# Patient Record
Sex: FEMALE | Race: BLACK OR AFRICAN AMERICAN | Employment: FULL TIME | URBAN - METROPOLITAN AREA
[De-identification: names, ages, dates, MRNs, and addresses within clinical notes are randomized per-mention and may not be internally consistent; named-entity substitution may affect disease eponyms.]

---

## 2017-02-02 ENCOUNTER — OFFICE VISIT (OUTPATIENT)
Dept: FAMILY MEDICINE CLINIC | Age: 37
End: 2017-02-02

## 2017-02-02 VITALS
SYSTOLIC BLOOD PRESSURE: 110 MMHG | HEIGHT: 64 IN | DIASTOLIC BLOOD PRESSURE: 68 MMHG | WEIGHT: 186.7 LBS | TEMPERATURE: 98.3 F | BODY MASS INDEX: 31.88 KG/M2 | RESPIRATION RATE: 20 BRPM | HEART RATE: 94 BPM | OXYGEN SATURATION: 98 %

## 2017-02-02 DIAGNOSIS — T50.5X5A EXPOSURE TO PHENTERMINE, INITIAL ENCOUNTER: Primary | ICD-10-CM

## 2017-02-02 DIAGNOSIS — L29.9 PRURITUS: ICD-10-CM

## 2017-02-02 DIAGNOSIS — E66.9 OBESITY (BMI 30-39.9): ICD-10-CM

## 2017-02-02 LAB
A-G RATIO,AGRAT: 1.6 RATIO (ref 1.1–2.6)
ALBUMIN SERPL-MCNC: 4.5 G/DL (ref 3.5–5)
ALP SERPL-CCNC: 67 U/L (ref 25–115)
ALT SERPL-CCNC: 13 U/L (ref 5–40)
AST SERPL W P-5'-P-CCNC: 17 U/L (ref 10–37)
BILIRUB SERPL-MCNC: 0.4 MG/DL (ref 0.2–1.2)
BILIRUBIN, DIRECT,CBIL: <0.2 MG/DL (ref 0–0.3)
GLOBULIN,GLOB: 2.9 G/DL (ref 2–4)
PROT SERPL-MCNC: 7.4 G/DL (ref 6.4–8.3)

## 2017-02-02 RX ORDER — PHENTERMINE HYDROCHLORIDE 37.5 MG/1
37.5 TABLET ORAL
Qty: 30 TAB | Refills: 0 | Status: SHIPPED | OUTPATIENT
Start: 2017-02-02 | End: 2018-05-08 | Stop reason: ALTCHOICE

## 2017-02-02 NOTE — PATIENT INSTRUCTIONS
Learning About Obesity  What is obesity? Obesity means having so much body fat that your health is in danger. Having too much body fat can lead to type 2 diabetes, heart disease, high blood pressure, arthritis, sleep apnea, and stroke. Even if you don't feel bad now, think about these health risks. Do they seem like a good reason to start on a new path toward a healthier weight? Or do you have another personal, powerful reason for wanting to lose weight? Whatever it is, keep it in mind. It can be hard to change eating habits and exercise habits. But with your own reason and plan, you can do it. How do you know if your weight is in the obesity range? To know if your weight is in the obesity range, your doctor looks at your body mass index (BMI) and waist size. Your BMI is a number that is calculated from your weight and your height. To figure your BMI for yourself, get a BMI table from your doctor or use an online tool, such as http://www.[employer].com/ on the [employer] of [employer]. What causes obesity? When you take in more calories than you burn off, you gain weight. How you eat, how active you are, and other things affect how your body uses calories and whether you gain weight. If you have family members who have too much body fat, you may have inherited a tendency to gain weight. And your family also helps form your eating and lifestyle habits, which can lead to obesity. Also, our busy lives make it harder to plan and cook healthy meals. For many of us, it's easier to reach for prepared foods, go out to eat, or go to the drive-through. But these foods are often high in saturated fat and calories. Portions are often too large. What can you do to reach a healthy weight? Focus on health, not diets. Diets are hard to stay on and don't work in the long run.  It is very hard to stay with a diet that includes lots of big changes in your eating habits. Instead of a diet, focus on lifestyle changes that will improve your health and achieve the right balance of energy and calories. To lose weight, you need to burn more calories than you take in. You can do it by eating healthy foods in reasonable amounts and becoming more active, even a little bit every day. Making small changes over time can add up to a lot. Make a plan for change. Many people have found that naming their reasons for change and staying focused on their plan can make a big difference. Work with your doctor to create a plan that is right for you. · Ask yourself: Rebecca Bernardo are my personal, most powerful reasons for wanting this change? What will my life look like when I've made the change? \"  · Set your long-term goal. Make it specific, such as \"I will lose x pounds. \"  · Break your long-term goal into smaller, short-term goals. Make these small steps specific and within your reach, things you know you can do. These steps are what keep you going from day to day. How can you stay on your plan for change? Be ready. Choose to start during a time when there are few events that might trigger slip-ups, like holidays, social events, and high-stress periods. Decide on your first few steps. Most people have more success when they make small changes, one step at a time. For example, you might switch a daily candy bar to a piece of fruit, walk 10 minutes more, or add more vegetables to a meal.  Line up your support people. Make sure you're not going to be alone as you make this change. Connect with people who understand how important it is to you. Ask family members and friends for help in keeping with your plan. And think about who could make it harder for you, and how to handle them. Try tracking. People who keep track of what they eat, feel, and do are better at losing weight. Try writing down things like:  · What and how much you eat.   · How you feel before and after each meal.  · Details about each meal (like eating out or at home, eating alone, or with friends or family). · What you do to be active. Look and plan. As you track, look for patterns that you may want to change. Take note of:  · When you eat and whether you skip meals. · How often you eat out. · How many fruits and vegetables you eat. · When you eat beyond feeling full. · When and why you eat for reasons other than being hungry. When you stray from your plan, don't get upset. Figure out what made you slip up and how you can fix it. Can you take medicines or have surgery to lose weight? Before your doctor will prescribe medicines or surgery, he or she will probably want you to be more active and follow your healthy eating plan for a period of time. These habits are key lifelong changes for managing your weight, with or without other medical treatment. And these changes can help you avoid weight-related health problems. Follow-up care is a key part of your treatment and safety. Be sure to make and go to all appointments, and call your doctor if you are having problems. It's also a good idea to know your test results and keep a list of the medicines you take. Where can you learn more? Go to http://luis-berna.info/. Enter N111 in the search box to learn more about \"Learning About Obesity. \"  Current as of: February 16, 2016  Content Version: 11.1  © 7760-9936 [employer]. Care instructions adapted under license by [employer] (which disclaims liability or warranty for this information). If you have questions about a medical condition or this instruction, always ask your healthcare professional. [address] any warranty or liability for your use of this information.

## 2017-02-02 NOTE — MR AVS SNAPSHOT
Visit Information Date & Time Provider Department Dept. Phone Encounter #  
 2/2/2017  7:45 AM Jason Hendricks MD [address] [place] [address] [phone] [id number] Upcoming Health Maintenance Date Due DTaP/Tdap/Td series (1 - Tdap) 5/31/2001 PAP AKA CERVICAL CYTOLOGY 5/12/2014 Allergies as of 2/2/2017  Review Complete On: 2/2/2017 By: Jason Hendricks MD  
 No Known Allergies Current Immunizations  Reviewed on 10/23/2015 Name Date Influenza Vaccine 9/25/2015 Influenza Vaccine PF 12/9/2016, 11/6/2014  9:11 AM  
 TB Skin Test (PPD) Intradermal 8/3/2015  1:35 PM  
  
 Not reviewed this visit You Were Diagnosed With   
  
 Codes Comments Exposure to phentermine, initial encounter    -  Primary ICD-10-CM: T50.5X5A 
ICD-9-CM: 994.9, E947.0 Obesity (BMI 30-39. 9)     ICD-10-CM: E66.9 ICD-9-CM: 278.00 Pruritus     ICD-10-CM: L29.9 ICD-9-CM: 698.9 Vitals BP Pulse Temp Resp Height(growth percentile) Weight(growth percentile) 110/68 (BP 1 Location: Left arm, BP Patient Position: Sitting) 94 98.3 °F (36.8 °C) (Oral) 20 5' 4\" (1.626 m) 186 lb 11.2 oz (84.7 kg) SpO2 BMI OB Status Smoking Status 98% 32.05 kg/m2 Having regular periods Never Smoker Vitals History BMI and BSA Data Body Mass Index Body Surface Area 32.05 kg/m 2 1.96 m 2 Preferred Pharmacy Pharmacy Name Phone [id number] [phone] [facility] [phone] Your Updated Medication List  
  
   
This list is accurate as of: 2/2/17  8:18 AM.  Always use your most recent med list.  
  
  
  
  
 buPROPion  mg XL tablet Commonly known as:  Kathlean Franco Take 1 Tab by mouth every morning.  
  
 ergocalciferol 50,000 unit capsule Commonly known as:  ERGOCALCIFEROL Take 1 Cap by mouth every seven (7) days. ibuprofen 800 mg tablet Commonly known as:  MOTRIN  
 Take 1 Tab by mouth every eight (8) hours as needed for Pain.  
  
 levonorgestrel-ethinyl estradiol 0.15 mg-30 mcg 3mpk Commonly known as:  Wilburt Finical Take  by mouth.  
  
 phentermine 37.5 mg tablet Commonly known as:  ADIPEX-P Take 1 Tab by mouth every morning. Max Daily Amount: 37.5 mg.  
  
 rizatriptan 10 mg disintegrating tablet Commonly known as:  MAXALT-MLT Take 1 Tab by mouth once as needed for Migraine for up to 1 dose. Prescriptions Printed Refills  
 phentermine (ADIPEX-P) 37.5 mg tablet 0 Sig: Take 1 Tab by mouth every morning. Max Daily Amount: 37.5 mg.  
 Class: Print Route: Oral  
  
To-Do List   
 02/02/2017 Lab:  HEPATIC FUNCTION PANEL Patient Instructions Learning About Obesity What is obesity? Obesity means having so much body fat that your health is in danger. Having too much body fat can lead to type 2 diabetes, heart disease, high blood pressure, arthritis, sleep apnea, and stroke. Even if you don't feel bad now, think about these health risks. Do they seem like a good reason to start on a new path toward a healthier weight? Or do you have another personal, powerful reason for wanting to lose weight? Whatever it is, keep it in mind. It can be hard to change eating habits and exercise habits. But with your own reason and plan, you can do it. How do you know if your weight is in the obesity range? To know if your weight is in the obesity range, your doctor looks at your body mass index (BMI) and waist size. Your BMI is a number that is calculated from your weight and your height. To figure your BMI for yourself, get a BMI table from your doctor or use an online tool, such as http://www.españa.com/ on the Toy[employer]ure[employer]rus of [employer]. What causes obesity? When you take in more calories than you burn off, you gain weight.  How you eat, how active you are, and other things affect how your body uses calories and whether you gain weight. If you have family members who have too much body fat, you may have inherited a tendency to gain weight. And your family also helps form your eating and lifestyle habits, which can lead to obesity. Also, our busy lives make it harder to plan and cook healthy meals. For many of us, it's easier to reach for prepared foods, go out to eat, or go to the drive-through. But these foods are often high in saturated fat and calories. Portions are often too large. What can you do to reach a healthy weight? Focus on health, not diets. Diets are hard to stay on and don't work in the long run. It is very hard to stay with a diet that includes lots of big changes in your eating habits. Instead of a diet, focus on lifestyle changes that will improve your health and achieve the right balance of energy and calories. To lose weight, you need to burn more calories than you take in. You can do it by eating healthy foods in reasonable amounts and becoming more active, even a little bit every day. Making small changes over time can add up to a lot. Make a plan for change. Many people have found that naming their reasons for change and staying focused on their plan can make a big difference. Work with your doctor to create a plan that is right for you. · Ask yourself: Alexa Louis are my personal, most powerful reasons for wanting this change? What will my life look like when I've made the change? \" · Set your long-term goal. Make it specific, such as \"I will lose x pounds. \" 
· Break your long-term goal into smaller, short-term goals. Make these small steps specific and within your reach, things you know you can do. These steps are what keep you going from day to day. How can you stay on your plan for change? Be ready.  Choose to start during a time when there are few events that might trigger slip-ups, like holidays, social events, and high-stress periods. Decide on your first few steps. Most people have more success when they make small changes, one step at a time. For example, you might switch a daily candy bar to a piece of fruit, walk 10 minutes more, or add more vegetables to a meal. 
Line up your support people. Make sure you're not going to be alone as you make this change. Connect with people who understand how important it is to you. Ask family members and friends for help in keeping with your plan. And think about who could make it harder for you, and how to handle them. Try tracking. People who keep track of what they eat, feel, and do are better at losing weight. Try writing down things like: · What and how much you eat. · How you feel before and after each meal. 
· Details about each meal (like eating out or at home, eating alone, or with friends or family). · What you do to be active. Look and plan. As you track, look for patterns that you may want to change. Take note of: · When you eat and whether you skip meals. · How often you eat out. · How many fruits and vegetables you eat. · When you eat beyond feeling full. · When and why you eat for reasons other than being hungry. When you stray from your plan, don't get upset. Figure out what made you slip up and how you can fix it. Can you take medicines or have surgery to lose weight? Before your doctor will prescribe medicines or surgery, he or she will probably want you to be more active and follow your healthy eating plan for a period of time. These habits are key lifelong changes for managing your weight, with or without other medical treatment. And these changes can help you avoid weight-related health problems. Follow-up care is a key part of your treatment and safety.  Be sure to make and go to all appointments, and call your doctor if you are having problems. It's also a good idea to know your test results and keep a list of the medicines you take. Where can you learn more? Go to http://luis-berna.info/. Enter N111 in the search box to learn more about \"Learning About Obesity. \" Current as of: February 16, 2016 Content Version: 11.1 © 4096-6048 [employer]. Care instructions adapted under license by [employer] (which disclaims liability or warranty for this information). If you have questions about a medical condition or this instruction, always ask your healthcare professional. [address] any warranty or liability for your use of this information. Introducing [facility] [place] [facility]! Dear Fermín Perrin: 
Thank you for requesting a Opbeat account. Our records indicate that you already have an active Opbeat account. You can access your account anytime at [email or web address] Did you know that you can access your hospital and ER discharge instructions at any time in Opbeat? You can also review all of your test results from your hospital stay or ER visit. Additional Information If you have questions, please visit the Frequently Asked Questions section of the Opbeat website at [email or web address] Remember, Opbeat is NOT to be used for urgent needs. For medical emergencies, dial 911. Now available from your iPhone and Android! Please provide this summary of care documentation to your next provider. Your primary care clinician is listed as [address]. If you have any questions after today's visit, please call [phone].

## 2017-02-02 NOTE — PROGRESS NOTES
Assessment/Plan:    Filomena Greenfield was seen today for obesity. Diagnoses and all orders for this visit:    Exposure to phentermine, initial encounter  -     HEPATIC FUNCTION PANEL; Future    Obesity (BMI 30-39.9)  -     phentermine (ADIPEX-P) 37.5 mg tablet; Take 1 Tab by mouth every morning. Max Daily Amount: 37.5 mg.    Pruritus    She assures me that she will get on track with her diet and exercise. Will try a 2nd mon of phentermine. Will get Zyrtec and try this for the skin itching. If no result in 2 weeks, will refer her to [facility]. F/u 4-6 weeks for wt check. The plan was discussed with the patient. The patient verbalized understanding and is in agreement with the plan. All medication potential side effects were discussed with the patient.    -------------------------------------------------------------------------------------------------------------------        Lonny oWlf is a 39 y.o. female and presents with Obesity (follow up)         Subjective:  Pt here for f/u of weight. Started her on phentermine in Dec.  She took the full Rx but has not been regular with exercise and diet. She has gained some of her lost weigfht back. Skin has been very itchy, no rash x 3 months. Has not made any changes in her diet or skin products. After the itching, has bought better moisturizers, but nothing is helping her issue. ROS:  Constitutional: No recent weight change. No weakness/fatigue. No f/c. Skin: No rashes, change in nails/hair, itching   HENT: No HA, dizziness. No hearing loss/tinnitus. No nasal congestion/discharge. Eyes: No change in vision, double/blurred vision or eye pain/redness. Cardiovascular: No CP/palpitations. No SPENCER/orthopnea/PND. Respiratory: No cough/sputum, dyspnea, wheezing. Gastointestinal: No dysphagia, reflux. No n/v. No constipation/diarrhea. No melena/rectal bleeding. Genitourinary: No dysuria, urinary hesitancy, nocturia, hematuria. No incontinence. Musculoskeletal: No joint pain/stiffness. No muscle pain/tenderness. Endo: No heat/cold intolerance, no polyuria/polydypsia. Heme: No h/o anemia. No easy bleeding/bruising. Allergy/Immunology: No seasonal rhinitis. Denies frequent colds, sinus/ear infections. Neurological: No seizures/numbness/weakness. No paresthesias. Psychiatric:  No depression, anxiety. The problem list was updated as a part of today's visit. Patient Active Problem List   Diagnosis Code   (none) - all problems resolved or deleted       The PSH, FH were reviewed. SH:  Social History   Substance Use Topics    Smoking status: Never Smoker    Smokeless tobacco: None    Alcohol use 2.0 oz/week     4 Glasses of wine per week      Comment: 4-5 glasses per week       Medications/Allergies:  Current Outpatient Prescriptions on File Prior to Visit   Medication Sig Dispense Refill    buPROPion XL (WELLBUTRIN XL) 300 mg XL tablet Take 1 Tab by mouth every morning. 30 Tab 1    ergocalciferol (ERGOCALCIFEROL) 50,000 unit capsule Take 1 Cap by mouth every seven (7) days. 12 Cap 1    levonorgestrel-ethinyl estradiol (SEASONALE) 0.15-30 mg-mcg per tablet Take  by mouth.  rizatriptan (MAXALT-MLT) 10 mg disintegrating tablet Take 1 Tab by mouth once as needed for Migraine for up to 1 dose. 6 Tab 3    ibuprofen (MOTRIN) 800 mg tablet Take 1 Tab by mouth every eight (8) hours as needed for Pain. 60 Tab 0     No current facility-administered medications on file prior to visit.          No Known Allergies      Health Maintenance:   Health Maintenance   Topic Date Due    DTaP/Tdap/Td series (1 - Tdap) 05/31/2001    PAP AKA CERVICAL CYTOLOGY  05/12/2014    INFLUENZA AGE 9 TO ADULT  Completed       Objective:  Visit Vitals    /68 (BP 1 Location: Left arm, BP Patient Position: Sitting)    Pulse 94    Temp 98.3 °F (36.8 °C) (Oral)    Resp 20    Ht 5' 4\" (1.626 m)    Wt 186 lb 11.2 oz (84.7 kg)    SpO2 98%    BMI 32.05 kg/m2          Nurses notes and VS reviewed. Physical Examination: General appearance - alert, well appearing, and in no distress          Labwork and Ancillary Studies:    CBC w/Diff  Lab Results   Component Value Date/Time    WBC 7.8 11/29/2016 11:32 AM    HGB 11.4 11/29/2016 11:32 AM    PLATELET 538 76/82/7347 11:32 AM         Basic Metabolic Profile  Lab Results   Component Value Date/Time    Sodium 138 11/29/2016 11:32 AM    Potassium 4.1 11/29/2016 11:32 AM    Chloride 95 11/29/2016 11:32 AM    CO2 21 11/29/2016 11:32 AM    Anion gap 22.0 11/29/2016 11:32 AM    Glucose 86 11/29/2016 11:32 AM    BUN 9 11/29/2016 11:32 AM    Creatinine 0.5 11/29/2016 11:32 AM    BUN/Creatinine ratio 20 07/28/2016 09:12 AM    GFR est AA >60 07/28/2016 09:12 AM    GFR est non-AA >60 07/28/2016 09:12 AM    Calcium 9.1 11/29/2016 11:32 AM         LFT  Lab Results   Component Value Date/Time    ALT (SGPT) 23 11/29/2016 11:32 AM    AST (SGOT) 23 11/29/2016 11:32 AM    Alk.  phosphatase 70 11/29/2016 11:32 AM    Bilirubin, direct <0.2 11/29/2016 11:32 AM    Bilirubin, total 0.5 11/29/2016 11:32 AM         Cholesterol  Lab Results   Component Value Date/Time    Cholesterol, total 161 11/29/2016 11:32 AM    HDL Cholesterol 81 11/29/2016 11:32 AM    LDL, calculated 60 11/29/2016 11:32 AM    Triglyceride 100 11/29/2016 11:32 AM

## 2017-02-02 NOTE — PROGRESS NOTES
Joseph Balderas is a 39 y.o. female  Chief Complaint   Patient presents with    Obesity     follow up     1. Have you been to the ER, urgent care clinic since your last visit? Hospitalized since your last visit? No    2. Have you seen or consulted any other health care providers outside of the [facility] since your last visit? Include any pap smears or colon screening.  No

## 2017-02-16 ENCOUNTER — HOSPITAL ENCOUNTER (OUTPATIENT)
Dept: NUTRITION | Age: 37
Discharge: HOME | End: 2017-02-16
Payer: COMMERCIAL

## 2017-02-16 PROCEDURE — 97802 MEDICAL NUTRITION INDIV IN: CPT

## 2017-02-16 NOTE — PROGRESS NOTES
Hiwot Cl[facility]ton [facility]  [address] [facility], [address] [place] [address] Road, [place], [address]  Phone: [phone]  Fax: [phone]   Nutrition Assessment - Medical Nutrition Therapy   Outpatient Initial Evaluation         Patient Name: Judson Sagastume : 1980   Treatment Diagnosis: Obesity Onset Date:    Referral Source: Juanjo Lo MD Start of Care [facility], [place]): 2017     Ht:  64 in  cm Wt: 186  lb  kg   BMI: 31.9  BMR   male  BMR female 1520     PMHx includes: Depression, anxiety     Medications of Nutritional Significance:   Wellbutrin, ergo, phentermine     Subjective/Assessment:   40 YO female referred to RD for obesity. Per BMI, pt is considered obese class 1. Pt has struggled with her weight for the past couple years. She has also dealt with depression, anxiety, stress and a bad relationship during that time, which likely contributed to the weight gain. She lives with her teenage daughter and works two jobs (1 full-time and 1 part-time). She is in her residency to become a LPC. 2 weeks ago, pt started working out every morning for 30-60 minutes doing cardio and strength training exercises to help with weight loss. Pt expressed comprehension, motivation and compliance is expected. Current Eating Patterns: Pt admits that she used to drink a lot of alcohol and dated a man who had very unhealthy eating habits, which influenced her habits. She is no longer with this man and her eating habits have improved. Her meal timing is sporadic and her \"meals\" lack balance. She skips b-fast most days, but otherwise has a boiled egg with half an avocado. Then, eats an apple an hour later. Lunch is either a chicken salad, wonton soup or 6-inch subway sandwich. She sometimes snacks in the afternoon (handful of popcorn). Adrián Balderrama is usually balanced, with a protein with starch and vegetables, but not always.  She mostly drinks water, but has 8 oz orange juice 4-5 days per week and drinks a bottle of wine on the weekends. She does not drink soda, but has coffee with creamer occasionally. Handouts/  Information Provided: [x]  Carbohydrates  [x]  Protein  []  Fiber  []  Serving Sizes  [x]  Snack Ideas  []  Food Journals []  Diabetes  []  Cholesterol  []  Sodium  [x]  Gen Nutr Guidelines  []  SBGM Guidelines  [x]  Others: List of non-starchy vegetables and Healthy Plate method, Egg Muffin recipe  - Discussed the Healthy Plate Method and appropriate portion sizes of different food groups. Explained the importance of combining carbohydrates with protein at meals and reviewed foods that contain each nutrient. Explained the importance of eating at regular times throughout the day and to avoid skipping meal to improve metabolism. Encouraged pt to continue current exercise (cardio and strength training) each week. Reviewed some b-fast ideas to get her day started right. Estimate Needs:   Calories: 1600  Protein: 100 Carbs: 180 Fat: 53   Kcal/day  g/day  g/day  g/day        percent: 25  45  30     Nutritional Goal - To promote lifestyle changes to result in:    [x]  Weight loss  []  Improved diabetic control  []  Decreased cholesterol levels  []  Decreased blood pressure  []  Weight maintenance []  Preventing any interactions associated with food allergies  []  Adequate weight gain toward goal weight  []  Other:        Recommendations: - Pt will combine carbohydrates with a protein source at every meal and snack.  - Pt will eat within 2 hours of waking and eat a meal every 4-5 hours while awake. If longer than 5 hours between meals, pt will have a snack. Avoid skipping meals.  - Pt will limit carbohydrate intake to 40-45 gm/meal and 15-20 gm/snack.  - Pt will follow the healthy plate method to ensure meals are balanced and to keep carbohydrate intake consistent throughout the day. - Pt will continue to exercise (cardio and strength training) at least 30 minutes, 3 days per week.      Information Reviewed with: pt Potential Barriers to Learning: none     Dietitian Signature: Yg Mcdonough RD Date: 2/16/2017   Follow-up: Mon, 3/20/17 @3:30pm at [facility] Time: 4:15 PM

## 2017-11-09 ENCOUNTER — CLINICAL SUPPORT (OUTPATIENT)
Dept: FAMILY MEDICINE CLINIC | Age: 37
End: 2017-11-09

## 2017-11-09 DIAGNOSIS — Z23 ENCOUNTER FOR IMMUNIZATION: Primary | ICD-10-CM

## 2017-11-09 NOTE — PATIENT INSTRUCTIONS
Vaccine Information Statement    Influenza (Flu) Vaccine (Inactivated or Recombinant): What you need to know    Many Vaccine Information Statements are available in English and other languages. See www.immunize.org/vis  Hojas de Información Sobre Vacunas están disponibles en Español y en muchos otros idiomas. Visite www.immunize.org/vis    1. Why get vaccinated? Influenza (flu) is a contagious disease that spreads around the [place] every year, usually between October and May. Flu is caused by influenza viruses, and is spread mainly by coughing, sneezing, and close contact. Anyone can get flu. Flu strikes suddenly and can last several days. Symptoms vary by age, but can include:   fever/chills   sore throat   muscle aches   fatigue   cough   headache    runny or stuffy nose    Flu can also lead to pneumonia and blood infections, and cause diarrhea and seizures in children. If you have a medical condition, such as heart or lung disease, flu can make it worse. Flu is more dangerous for some people. Infants and young children, people 72years of age and older, pregnant women, and people with certain health conditions or a weakened immune system are at greatest risk. Each year thousands of people in the [facility] die from flu, and many more are hospitalized. Flu vaccine can:   keep you from getting flu,   make flu less severe if you do get it, and   keep you from spreading flu to your family and other people. 2. Inactivated and recombinant flu vaccines    A dose of flu vaccine is recommended every flu season. Children 6 months through 6years of age may need two doses during the same flu season. Everyone else needs only one dose each flu season.        Some inactivated flu vaccines contain a very small amount of a mercury-based preservative called thimerosal. Studies have not shown thimerosal in vaccines to be harmful, but flu vaccines that do not contain thimerosal are available. There is no live flu virus in flu shots. They cannot cause the flu. There are many flu viruses, and they are always changing. Each year a new flu vaccine is made to protect against three or four viruses that are likely to cause disease in the upcoming flu season. But even when the vaccine doesnt exactly match these viruses, it may still provide some protection    Flu vaccine cannot prevent:   flu that is caused by a virus not covered by the vaccine, or   illnesses that look like flu but are not. It takes about 2 weeks for protection to develop after vaccination, and protection lasts through the flu season. 3. Some people should not get this vaccine    Tell the person who is giving you the vaccine:     If you have any severe, life-threatening allergies. If you ever had a life-threatening allergic reaction after a dose of flu vaccine, or have a severe allergy to any part of this vaccine, you may be advised not to get vaccinated. Most, but not all, types of flu vaccine contain a small amount of egg protein.  If you ever had Guillain-Barré Syndrome (also called GBS). Some people with a history of GBS should not get this vaccine. This should be discussed with your doctor.  If you are not feeling well. It is usually okay to get flu vaccine when you have a mild illness, but you might be asked to come back when you feel better. 4. Risks of a vaccine reaction    With any medicine, including vaccines, there is a chance of reactions. These are usually mild and go away on their own, but serious reactions are also possible. Most people who get a flu shot do not have any problems with it.      Minor problems following a flu shot include:    soreness, redness, or swelling where the shot was given     hoarseness   sore, red or itchy eyes   cough   fever   aches   headache   itching   fatigue  If these problems occur, they usually begin soon after the shot and last 1 or 2 days. More serious problems following a flu shot can include the following:     There may be a small increased risk of Guillain-Barré Syndrome (GBS) after inactivated flu vaccine. This risk has been estimated at 1 or 2 additional cases per million people vaccinated. This is much lower than the risk of severe complications from flu, which can be prevented by flu vaccine.  Young children who get the flu shot along with pneumococcal vaccine (PCV13) and/or DTaP vaccine at the same time might be slightly more likely to have a seizure caused by fever. Ask your doctor for more information. Tell your doctor if a child who is getting flu vaccine has ever had a seizure. Problems that could happen after any injected vaccine:      People sometimes faint after a medical procedure, including vaccination. Sitting or lying down for about 15 minutes can help prevent fainting, and injuries caused by a fall. Tell your doctor if you feel dizzy, or have vision changes or ringing in the ears.  Some people get severe pain in the shoulder and have difficulty moving the arm where a shot was given. This happens very rarely.  Any medication can cause a severe allergic reaction. Such reactions from a vaccine are very rare, estimated at about 1 in a million doses, and would happen within a few minutes to a few hours after the vaccination. As with any medicine, there is a very remote chance of a vaccine causing a serious injury or death. The safety of vaccines is always being monitored. For more information, visit: www.cdc.gov/vaccinesafety/    5. What if there is a serious reaction? What should I look for?  Look for anything that concerns you, such as signs of a severe allergic reaction, very high fever, or unusual behavior.     Signs of a severe allergic reaction can include hives, swelling of the face and throat, difficulty breathing, a fast heartbeat, dizziness, and weakness - usually within a few minutes to a few hours after the vaccination. What should I do?  If you think it is a severe allergic reaction or other emergency that cant wait, call 9-1-1 and get the person to the nearest hospital. Otherwise, call your doctor.  Reactions should be reported to the Vaccine Adverse Event Reporting System (VAERS). Your doctor should file this report, or you can do it yourself through  the VAERS web site at www.vaers. [facility].gov, or by calling 9-937.710.5265. VAERS does not give medical advice. 6. The National Vaccine Injury Compensation Program    The [facility] Vaccine Injury Compensation Program (VICP) is a federal program that was created to compensate people who may have been injured by certain vaccines. Persons who believe they may have been injured by a vaccine can learn about the program and about filing a claim by calling 0-154.396.5824 or visiting the [employer] website at www.[facility].gov/vaccinecompensation. There is a time limit to file a claim for compensation. 7. How can I learn more?  Ask your healthcare provider. He or she can give you the vaccine package insert or suggest other sources of information.  Call your local or state health department.  Contact the Centers for Disease Control and Prevention (CDC):  - Call 1-720.806.3612 (1-800-CDC-INFO) or  - Visit CDCs website at www.cdc.gov/flu    Vaccine Information Statement   Inactivated Influenza Vaccine   8/7/2015  42 LARA Smith [id number]    Department of Health and Human Services  Centers for Disease Control and Prevention    Office Use Only

## 2017-11-09 NOTE — PROGRESS NOTES
Per verbal orders of Dr. Pamela Marie, injection of flu shot given by Tori Najjar, LPN. Patient instructed to remain in clinic for 20 minutes afterwards, and to report any adverse reaction to me immediately. Vaccine documentation completed. Tolerated well. Consent signed.

## 2017-12-20 RX ORDER — BUPROPION HYDROCHLORIDE 300 MG/1
TABLET ORAL
Qty: 90 TAB | Refills: 0 | Status: SHIPPED | OUTPATIENT
Start: 2017-12-20 | End: 2018-11-02 | Stop reason: ALTCHOICE

## 2018-03-09 ENCOUNTER — OFFICE VISIT (OUTPATIENT)
Dept: FAMILY MEDICINE CLINIC | Age: 38
End: 2018-03-09

## 2018-03-09 ENCOUNTER — HOSPITAL ENCOUNTER (OUTPATIENT)
Dept: LAB | Age: 38
Discharge: HOME | End: 2018-03-09
Payer: COMMERCIAL

## 2018-03-09 VITALS
WEIGHT: 181 LBS | RESPIRATION RATE: 18 BRPM | SYSTOLIC BLOOD PRESSURE: 100 MMHG | HEIGHT: 64 IN | OXYGEN SATURATION: 99 % | HEART RATE: 64 BPM | TEMPERATURE: 98.1 F | DIASTOLIC BLOOD PRESSURE: 70 MMHG | BODY MASS INDEX: 30.9 KG/M2

## 2018-03-09 DIAGNOSIS — Z00.00 ANNUAL PHYSICAL EXAM: ICD-10-CM

## 2018-03-09 DIAGNOSIS — E04.9 GOITER: Primary | ICD-10-CM

## 2018-03-09 DIAGNOSIS — E66.9 OBESITY (BMI 30-39.9): ICD-10-CM

## 2018-03-09 LAB
ALBUMIN SERPL-MCNC: 3.6 G/DL (ref 3.4–5)
ALBUMIN/GLOB SERPL: 0.9 {RATIO} (ref 0.8–1.7)
ALP SERPL-CCNC: 65 U/L (ref 45–117)
ALT SERPL-CCNC: 18 U/L (ref 13–56)
ANION GAP SERPL CALC-SCNC: 7 MMOL/L (ref 3–18)
AST SERPL-CCNC: 15 U/L (ref 15–37)
BASOPHILS # BLD: 0 K/UL (ref 0–0.06)
BASOPHILS NFR BLD: 0 % (ref 0–2)
BILIRUB DIRECT SERPL-MCNC: 0.2 MG/DL (ref 0–0.2)
BILIRUB SERPL-MCNC: 0.6 MG/DL (ref 0.2–1)
BUN SERPL-MCNC: 11 MG/DL (ref 7–18)
BUN/CREAT SERPL: 12 (ref 12–20)
CALCIUM SERPL-MCNC: 9.1 MG/DL (ref 8.5–10.1)
CHLORIDE SERPL-SCNC: 109 MMOL/L (ref 100–108)
CHOLEST SERPL-MCNC: 146 MG/DL
CO2 SERPL-SCNC: 25 MMOL/L (ref 21–32)
CREAT SERPL-MCNC: 0.89 MG/DL (ref 0.6–1.3)
DIFFERENTIAL METHOD BLD: ABNORMAL
EOSINOPHIL # BLD: 0.1 K/UL (ref 0–0.4)
EOSINOPHIL NFR BLD: 2 % (ref 0–5)
ERYTHROCYTE [DISTWIDTH] IN BLOOD BY AUTOMATED COUNT: 15.6 % (ref 11.6–14.5)
GLOBULIN SER CALC-MCNC: 4 G/DL (ref 2–4)
GLUCOSE SERPL-MCNC: 90 MG/DL (ref 74–99)
HCT VFR BLD AUTO: 35.3 % (ref 35–45)
HDLC SERPL-MCNC: 84 MG/DL (ref 40–60)
HDLC SERPL: 1.7 {RATIO} (ref 0–5)
HGB BLD-MCNC: 11.3 G/DL (ref 12–16)
LDLC SERPL CALC-MCNC: 45 MG/DL (ref 0–100)
LIPID PROFILE,FLP: ABNORMAL
LYMPHOCYTES # BLD: 1.6 K/UL (ref 0.9–3.6)
LYMPHOCYTES NFR BLD: 24 % (ref 21–52)
MCH RBC QN AUTO: 26 PG (ref 24–34)
MCHC RBC AUTO-ENTMCNC: 32 G/DL (ref 31–37)
MCV RBC AUTO: 81.3 FL (ref 74–97)
MONOCYTES # BLD: 0.4 K/UL (ref 0.05–1.2)
MONOCYTES NFR BLD: 6 % (ref 3–10)
NEUTS SEG # BLD: 4.7 K/UL (ref 1.8–8)
NEUTS SEG NFR BLD: 68 % (ref 40–73)
PLATELET # BLD AUTO: 391 K/UL (ref 135–420)
PMV BLD AUTO: 11.6 FL (ref 9.2–11.8)
POTASSIUM SERPL-SCNC: 4.5 MMOL/L (ref 3.5–5.5)
PROT SERPL-MCNC: 7.6 G/DL (ref 6.4–8.2)
RBC # BLD AUTO: 4.34 M/UL (ref 4.2–5.3)
SODIUM SERPL-SCNC: 141 MMOL/L (ref 136–145)
T3FREE SERPL-MCNC: 2.7 PG/ML (ref 2.3–4.2)
T4 FREE SERPL-MCNC: 1.1 NG/DL (ref 0.7–1.5)
TRIGL SERPL-MCNC: 85 MG/DL (ref ?–150)
TSH SERPL DL<=0.05 MIU/L-ACNC: 0.85 UIU/ML (ref 0.36–3.74)
VLDLC SERPL CALC-MCNC: 17 MG/DL
WBC # BLD AUTO: 6.8 K/UL (ref 4.6–13.2)

## 2018-03-09 PROCEDURE — 80061 LIPID PANEL: CPT | Performed by: INTERNAL MEDICINE

## 2018-03-09 PROCEDURE — 80076 HEPATIC FUNCTION PANEL: CPT | Performed by: INTERNAL MEDICINE

## 2018-03-09 PROCEDURE — 84439 ASSAY OF FREE THYROXINE: CPT | Performed by: INTERNAL MEDICINE

## 2018-03-09 PROCEDURE — 84481 FREE ASSAY (FT-3): CPT | Performed by: INTERNAL MEDICINE

## 2018-03-09 PROCEDURE — 80048 BASIC METABOLIC PNL TOTAL CA: CPT | Performed by: INTERNAL MEDICINE

## 2018-03-09 PROCEDURE — 82306 VITAMIN D 25 HYDROXY: CPT | Performed by: INTERNAL MEDICINE

## 2018-03-09 PROCEDURE — 86376 MICROSOMAL ANTIBODY EACH: CPT | Performed by: INTERNAL MEDICINE

## 2018-03-09 PROCEDURE — 85025 COMPLETE CBC W/AUTO DIFF WBC: CPT | Performed by: INTERNAL MEDICINE

## 2018-03-09 PROCEDURE — 36415 COLL VENOUS BLD VENIPUNCTURE: CPT | Performed by: INTERNAL MEDICINE

## 2018-03-09 PROCEDURE — 84443 ASSAY THYROID STIM HORMONE: CPT | Performed by: INTERNAL MEDICINE

## 2018-03-09 NOTE — MR AVS SNAPSHOT
Moustapha Muller 
 
 
 [address] [facility] [address] [phone] Patient: Cece Crawford MRN: [id number] ZIM:[birth year] Visit Information Date & Time Provider Department Dept. Phone Encounter #  
 3/9/2018  7:45 AM Boo Siddharth Eubanks [facility] [phone] [id number] Upcoming Health Maintenance Date Due DTaP/Tdap/Td series (1 - Tdap) 5/31/2001 PAP AKA CERVICAL CYTOLOGY 5/12/2014 Allergies as of 3/9/2018  Review Complete On: 3/9/2018 By: Boo Eubanks MD  
 No Known Allergies Current Immunizations  Reviewed on 11/9/2017 Name Date Influenza Vaccine 9/25/2015 Influenza Vaccine (Quad) PF 11/9/2017  2:09 PM  
 Influenza Vaccine PF 12/9/2016, 11/6/2014  9:11 AM  
 TB Skin Test (PPD) Intradermal 8/3/2015  1:35 PM  
  
 Not reviewed this visit You Were Diagnosed With   
  
 Codes Comments Annual physical exam    -  Primary ICD-10-CM: Z00.00 ICD-9-CM: V70.0 Goiter     ICD-10-CM: E04.9 ICD-9-CM: 240.9 Vitals BP Pulse Temp Resp Height(growth percentile) Weight(growth percentile) 100/70 (BP 1 Location: Left arm, BP Patient Position: Sitting) 64 98.1 °F (36.7 °C) (Oral) 18 5' 4\" (1.626 m) 181 lb (82.1 kg) LMP SpO2 BMI OB Status Smoking Status 01/23/2018 99% 31.07 kg/m2 Having regular periods Never Smoker Vitals History BMI and BSA Data Body Mass Index Body Surface Area 31.07 kg/m 2 1.93 m 2 Preferred Pharmacy Pharmacy Name Phone [address] [facility] [phone] Your Updated Medication List  
  
   
This list is accurate as of 3/9/18  8:18 AM.  Always use your most recent med list.  
  
  
  
  
 buPROPion  mg XL tablet Commonly known as:  WELLBUTRIN XL  
TAKE 1 TABLET BY MOUTH EVERY MORNING  
  
 ergocalciferol 50,000 unit capsule Commonly known as:  ERGOCALCIFEROL Take 1 Cap by mouth every seven (7) days. ibuprofen 800 mg tablet Commonly known as:  MOTRIN Take 1 Tab by mouth every eight (8) hours as needed for Pain.  
  
 levonorgestrel-ethinyl estradiol 0.15 mg-30 mcg 3mpk Commonly known as:  Christina Oms Take  by mouth.  
  
 phentermine 37.5 mg tablet Commonly known as:  ADIPEX-P Take 1 Tab by mouth every morning. Max Daily Amount: 37.5 mg.  
  
 rizatriptan 10 mg disintegrating tablet Commonly known as:  MAXALT-MLT Take 1 Tab by mouth once as needed for Migraine for up to 1 dose. To-Do List   
 03/09/2018 Lab:  CBC WITH AUTOMATED DIFF   
  
 03/09/2018 Lab:  HEPATIC FUNCTION PANEL   
  
 03/09/2018 Lab:  LIPID PANEL   
  
 03/09/2018 Lab:  METABOLIC PANEL, BASIC   
  
 03/09/2018 Lab:  T3, FREE   
  
 03/09/2018 Lab:  T4, FREE   
  
 03/09/2018 Lab:  THYROID ANTIBODY PANEL   
  
 03/09/2018 Lab:  TSH 3RD GENERATION   
  
 03/09/2018 Imaging:  US THYROID/PARATHYROID/SOFT TISS   
  
 03/09/2018 Lab:  VITAMIN D, 25 HYDROXY Patient Instructions Goiter: Care Instructions Your Care Instructions A goiter is an enlarged thyroid gland. It can cause swelling in your neck. Your thyroid is found in the front of your neck. It makes a hormone that controls how your body uses energy. Goiters are caused by different things. Some are caused by high or low levels of thyroid hormone. Others are caused by too little iodine in the diet, or a growth or disease in the thyroid. In the [place], most goiters are caused by long-term autoimmune thyroiditis. This is also called Hashimoto's thyroiditis. It happens when the body's immune system damages the thyroid. You may take thyroid hormone to reduce the size of your goiter. Or you may need surgery or radioactive iodine treatment. Some people don't need any treatment. They only need to watch for changes in the goiter. Follow-up care is a key part of your treatment and safety. Be sure to make and go to all appointments, and call your doctor if you are having problems. It's also a good idea to know your test results and keep a list of the medicines you take. How can you care for yourself at home? · Be safe with medicines. Take your medicines exactly as prescribed. Call your doctor if you think you are having a problem with your medicine. You will get more details on the specific medicines your doctor prescribes. When should you call for help? Call 911 anytime you think you may need emergency care. For example, call if: 
? · You have trouble breathing. ? Watch closely for changes in your health, and be sure to contact your doctor if: 
? · Your eyes turn red and bulge. ? · You have trouble swallowing. ? · You feel very tired or weak. ? · You lose weight but are eating the same or more than usual.  
Where can you learn more? Go to http://luis-berna.info/. Enter L377 in the search box to learn more about \"Goiter: Care Instructions. \" Current as of: May 12, 2017 Content Version: 11.4 © 7055-5452 [employer]. Care instructions adapted under license by [employer] (which disclaims liability or warranty for this information). If you have questions about a medical condition or this instruction, always ask your healthcare professional. [address] any warranty or liability for your use of this information. Introducing [facility] & HEALTH SERVICES! Dear Enid Ortega: 
Thank you for requesting a InCast account. Our records indicate that you already have an active InCast account. You can access your account anytime at [email or web address] Did you know that you can access your hospital and ER discharge instructions at any time in InCast? You can also review all of your test results from your hospital stay or ER visit. Additional Information If you have questions, please visit the Frequently Asked Questions section of the [email or web address] website at [email or web address] Remember, SNAPP' is NOT to be used for urgent needs. For medical emergencies, dial 911. Now available from your iPhone and Android! Please provide this summary of care documentation to your next provider. Your primary care clinician is listed as [place]. If you have any questions after today's visit, please call [phone].

## 2018-03-09 NOTE — PROGRESS NOTES
Assessment/Plan:    *Diagnoses and all orders for this visit:    1. Goiter  -     US THYROID/PARATHYROID/SOFT TISS; Future    2. Annual physical exam  -     TSH 3RD GENERATION; Future  -     T4, FREE; Future  -     T3, FREE; Future  -     THYROID ANTIBODY PANEL; Future  -     CBC WITH AUTOMATED DIFF; Future  -     HEPATIC FUNCTION PANEL; Future  -     LIPID PANEL; Future  -     METABOLIC PANEL, BASIC; Future  -     VITAMIN D, 25 HYDROXY; Future    3. Obesity (BMI 30-39.9)  -     REFERRAL TO WEIGHT LOSS        Will contact pt with results. The plan was discussed with the patient. The patient verbalized understanding and is in agreement with the plan. All medication potential side effects were discussed with the patient.    -------------------------------------------------------------------------------------------------------------------        Oralee Laura is a 40 y.o. female and presents with New Order (Lab order- thyroid )         Subjective:  Pt here for concerns about her weight. This has been a constant madden all her life. Has strong fam hx for thyroid disease in various forms and we have checked this many times before. Is due for a repeat of labs. Has fatigue, brittle nails, dry, itchy skin, difficulty with losing weight. Has also had a goiter for some years, has never had an US. ROS:  Constitutional: No recent weight change. No weakness/fatigue. No f/c. Skin: No rashes, change in nails/hair, itching   HENT: No HA, dizziness. No hearing loss/tinnitus. No nasal congestion/discharge. Eyes: No change in vision, double/blurred vision or eye pain/redness. Cardiovascular: No CP/palpitations. No SPENCER/orthopnea/PND. Respiratory: No cough/sputum, dyspnea, wheezing. Gastointestinal: No dysphagia, reflux. No n/v. No constipation/diarrhea. No melena/rectal bleeding. Genitourinary: No dysuria, urinary hesitancy, nocturia, hematuria. No incontinence.    Musculoskeletal: No joint pain/stiffness. No muscle pain/tenderness. Endo: No heat/cold intolerance, no polyuria/polydypsia. Heme: No h/o anemia. No easy bleeding/bruising. Allergy/Immunology: No seasonal rhinitis. Denies frequent colds, sinus/ear infections. Neurological: No seizures/numbness/weakness. No paresthesias. Psychiatric:  No depression, anxiety. The problem list was updated as a part of today's visit. Patient Active Problem List   Diagnosis Code    Obesity (BMI 30-39. 9) E66.9       The PSH,  were reviewed. SH:  Social History   Substance Use Topics    Smoking status: Never Smoker    Smokeless tobacco: Never Used    Alcohol use 2.0 oz/week     4 Glasses of wine per week      Comment: 4-5 glasses per week       Medications/Allergies:  Current Outpatient Prescriptions on File Prior to Visit   Medication Sig Dispense Refill    buPROPion XL (WELLBUTRIN XL) 300 mg XL tablet TAKE 1 TABLET BY MOUTH EVERY MORNING 90 Tab 0    ergocalciferol (ERGOCALCIFEROL) 50,000 unit capsule Take 1 Cap by mouth every seven (7) days. 12 Cap 1    ibuprofen (MOTRIN) 800 mg tablet Take 1 Tab by mouth every eight (8) hours as needed for Pain. 60 Tab 0    levonorgestrel-ethinyl estradiol (SEASONALE) 0.15-30 mg-mcg per tablet Take  by mouth.  rizatriptan (MAXALT-MLT) 10 mg disintegrating tablet Take 1 Tab by mouth once as needed for Migraine for up to 1 dose. 6 Tab 3    phentermine (ADIPEX-P) 37.5 mg tablet Take 1 Tab by mouth every morning. Max Daily Amount: 37.5 mg. 30 Tab 0     No current facility-administered medications on file prior to visit.          No Known Allergies      Health Maintenance:   Health Maintenance   Topic Date Due    DTaP/Tdap/Td series (1 - Tdap) 05/31/2001    PAP AKA CERVICAL CYTOLOGY  05/12/2014    Influenza Age 9 to Adult  Completed       Objective:  Visit Vitals    /70 (BP 1 Location: Left arm, BP Patient Position: Sitting)    Pulse 64    Temp 98.1 °F (36.7 °C) (Oral)    Resp 18  Ht 5' 4\" (1.626 m)    Wt 181 lb (82.1 kg)    LMP 01/23/2018    SpO2 99%    BMI 31.07 kg/m2          Nurses notes and VS reviewed. Physical Examination: General appearance - alert, well appearing, and in no distress  Neck - thyroid exam: thyroid enlarged, smooth, nontender, no nodules  Chest - clear to auscultation, no wheezes, rales or rhonchi, symmetric air entry  Heart - normal rate, regular rhythm, normal S1, S2, no murmurs, rubs, clicks or gallops          Labwork and Ancillary Studies:    CBC w/Diff  Lab Results   Component Value Date/Time    WBC 7.8 11/29/2016 11:32 AM    HGB 11.4 (L) 11/29/2016 11:32 AM    PLATELET 552 33/48/6927 11:32 AM         Basic Metabolic Profile  Lab Results   Component Value Date/Time    Sodium 138 11/29/2016 11:32 AM    Potassium 4.1 11/29/2016 11:32 AM    Chloride 95 (L) 11/29/2016 11:32 AM    CO2 21 11/29/2016 11:32 AM    Anion gap 22.0 11/29/2016 11:32 AM    Glucose 86 11/29/2016 11:32 AM    BUN 9 11/29/2016 11:32 AM    Creatinine 0.5 11/29/2016 11:32 AM    BUN/Creatinine ratio 20 07/28/2016 09:12 AM    GFR est AA >60 07/28/2016 09:12 AM    GFR est non-AA >60 07/28/2016 09:12 AM    Calcium 9.1 11/29/2016 11:32 AM         LFT  Lab Results   Component Value Date/Time    ALT (SGPT) 13 02/02/2017 08:32 AM    AST (SGOT) 17 02/02/2017 08:32 AM    Alk.  phosphatase 67 02/02/2017 08:32 AM    Bilirubin, direct <0.2 02/02/2017 08:32 AM    Bilirubin, total 0.4 02/02/2017 08:32 AM          Cholesterol  Lab Results   Component Value Date/Time    Cholesterol, total 161 11/29/2016 11:32 AM    HDL Cholesterol 81 (H) 11/29/2016 11:32 AM    LDL, calculated 60 11/29/2016 11:32 AM    Triglyceride 100 11/29/2016 11:32 AM

## 2018-03-09 NOTE — PATIENT INSTRUCTIONS
Goiter: Care Instructions  Your Care Instructions    A goiter is an enlarged thyroid gland. It can cause swelling in your neck. Your thyroid is found in the front of your neck. It makes a hormone that controls how your body uses energy. Goiters are caused by different things. Some are caused by high or low levels of thyroid hormone. Others are caused by too little iodine in the diet, or a growth or disease in the thyroid. In the [place], most goiters are caused by long-term autoimmune thyroiditis. This is also called Hashimoto's thyroiditis. It happens when the body's immune system damages the thyroid. You may take thyroid hormone to reduce the size of your goiter. Or you may need surgery or radioactive iodine treatment. Some people don't need any treatment. They only need to watch for changes in the goiter. Follow-up care is a key part of your treatment and safety. Be sure to make and go to all appointments, and call your doctor if you are having problems. It's also a good idea to know your test results and keep a list of the medicines you take. How can you care for yourself at home? · Be safe with medicines. Take your medicines exactly as prescribed. Call your doctor if you think you are having a problem with your medicine. You will get more details on the specific medicines your doctor prescribes. When should you call for help? Call 911 anytime you think you may need emergency care. For example, call if:  ? · You have trouble breathing. ? Watch closely for changes in your health, and be sure to contact your doctor if:  ? · Your eyes turn red and bulge. ? · You have trouble swallowing. ? · You feel very tired or weak. ? · You lose weight but are eating the same or more than usual.   Where can you learn more? Go to http://luis-berna.info/. Enter G225 in the search box to learn more about \"Goiter: Care Instructions. \"  Current as of:  May 12, 2017  Content Version: 11.4  © 7242-4707 Healthwise, Incorporated. Care instructions adapted under license by [employer] [place] (which disclaims liability or warranty for this information). If you have questions about a medical condition or this instruction, always ask your healthcare professional. [address] any warranty or liability for your use of this information.

## 2018-03-09 NOTE — PROGRESS NOTES
Raeann Aldana is a 40 y.o. female (: 1980) presenting to address:    Chief Complaint   Patient presents with    New Order     Lab order- thyroid        Vitals:    18 0750   BP: 100/70   Pulse: 64   Resp: 18   Temp: 98.1 °F (36.7 °C)   TempSrc: Oral   SpO2: 99%   Weight: 181 lb (82.1 kg)   Height: 5' 4\" (1.626 m)   PainSc:   0 - No pain   LMP: 2018       Learning Assessment:     Learning Assessment 4/10/2014   PRIMARY LEARNER Patient   HIGHEST LEVEL OF EDUCATION - PRIMARY LEARNER  4 YEARS OF COLLEGE   BARRIERS PRIMARY LEARNER NONE   PRIMARY LANGUAGE ENGLISH   LEARNER PREFERENCE PRIMARY VIDEOS   ANSWERED BY patient   RELATIONSHIP SELF     Depression Screening:     PHQ over the last two weeks 3/9/2018   PHQ Not Done Active Diagnosis of Depression or Bipolar Disorder   Little interest or pleasure in doing things -   Feeling down, depressed or hopeless -   Total Score PHQ 2 -   Trouble falling or staying asleep, or sleeping too much -   Feeling tired or having little energy -   Poor appetite or overeating -   Feeling bad about yourself - or that you are a failure or have let yourself or your family down -   Trouble concentrating on things such as school, work, reading or watching TV -   Moving or speaking so slowly that other people could have noticed; or the opposite being so fidgety that others notice -   Thoughts of being better off dead, or hurting yourself in some way -   PHQ 9 Score -     Fall Risk Assessment:     Fall Risk Assessment, last 12 mths 3/9/2018   Able to walk? Yes   Fall in past 12 months? No     Abuse Screening:     Abuse Screening Questionnaire 3/9/2018   Do you ever feel afraid of your partner? N   Are you in a relationship with someone who physically or mentally threatens you? N   Is it safe for you to go home? Y     Coordination of Care Questionaire:   1. Have you been to the ER, urgent care clinic since your last visit? Hospitalized since your last visit? NO    2.  Have you seen or consulted any other health care providers outside of the [address] [place] [address] since your last visit? Include any pap smears or colon screening. YES GYN 7/2017    Advanced Directive:   1. Do you have an Advanced Directive? YES    2. Would you like information on Advanced Directives?  NO

## 2018-03-10 LAB — 25(OH)D3 SERPL-MCNC: 22.4 NG/ML (ref 30–100)

## 2018-03-11 NOTE — PROGRESS NOTES
Vit d is low. Start OTC 1000 units daily. Her thyroid work up was normal.  Rest of her labs were normal as well.     Will await thyroid US results and will see what advice she gets from the [employer] loss program.

## 2018-03-12 LAB
THYROGLOB AB SERPL-ACNC: <1 IU/ML (ref 0–0.9)
THYROPEROXIDASE AB SERPL-ACNC: 15 IU/ML (ref 0–34)

## 2018-03-13 ENCOUNTER — DOCUMENTATION ONLY (OUTPATIENT)
Dept: FAMILY MEDICINE CLINIC | Age: 38
End: 2018-03-13

## 2018-03-22 ENCOUNTER — TELEPHONE (OUTPATIENT)
Dept: FAMILY MEDICINE CLINIC | Age: 38
End: 2018-03-22

## 2018-03-22 NOTE — TELEPHONE ENCOUNTER
Notify pt her thyroid US showed just one small (<1 cm) nodule on the LT lobe of the thyroid. Does not show any abnormalities. Based on this, I would not recommend anything further.   Did she get in contact with the weight loss program?

## 2018-03-23 NOTE — TELEPHONE ENCOUNTER
Patient notified of results she voiced understanding and has an appointment with weight loss on 4.19.18

## 2018-04-11 DIAGNOSIS — E04.9 GOITER: ICD-10-CM

## 2018-04-19 ENCOUNTER — CLINICAL SUPPORT (OUTPATIENT)
Dept: FAMILY MEDICINE CLINIC | Age: 38
End: 2018-04-19

## 2018-04-19 DIAGNOSIS — E66.9 OBESITY, UNSPECIFIED CLASSIFICATION, UNSPECIFIED OBESITY TYPE, UNSPECIFIED WHETHER SERIOUS COMORBIDITY PRESENT: Primary | ICD-10-CM

## 2018-05-08 ENCOUNTER — OFFICE VISIT (OUTPATIENT)
Dept: FAMILY MEDICINE CLINIC | Age: 38
End: 2018-05-08

## 2018-05-08 VITALS
SYSTOLIC BLOOD PRESSURE: 112 MMHG | BODY MASS INDEX: 31.21 KG/M2 | DIASTOLIC BLOOD PRESSURE: 59 MMHG | HEART RATE: 80 BPM | RESPIRATION RATE: 16 BRPM | WEIGHT: 187.3 LBS | HEIGHT: 65 IN

## 2018-05-08 DIAGNOSIS — E66.9 OBESITY (BMI 30-39.9): Primary | ICD-10-CM

## 2018-05-08 DIAGNOSIS — E55.9 VITAMIN D DEFICIENCY: ICD-10-CM

## 2018-05-08 NOTE — MR AVS SNAPSHOT
Zechariah Mohamud [place] [address] [facility] [address] [facility] [id number] 
[phone] Patient: Sanjay Minors MRN: [id number] Visit Information Date & Time Provider Department Dept. Phone Encounter #  
 5/8/2018  2:45 PM Bi P[facility] [place] [id number] Follow-up Instructions Return in about 4 weeks (around 6/5/2018) for MS[facility] & Lab Draw. Follow-up and Disposition History Upcoming Health Maintenance Date Due DTaP/Tdap/Td series (1 - Tdap) 5/31/2001 PAP AKA CERVICAL CYTOLOGY 5/12/2014 Influenza Age 5 to Adult 8/1/2018 Allergies as of 5/8/2018  Review Complete On: 5/8/2018 By: Bi Paz, DO No Known Allergies Current Immunizations  Reviewed on 11/9/2017 Name Date Influenza Vaccine 9/25/2015 Influenza Vaccine (Quad) PF 11/9/2017  2:09 PM  
 Influenza Vaccine PF 12/9/2016, 11/6/2014  9:11 AM  
 TB Skin Test (PPD) Intradermal 8/3/2015  1:35 PM  
  
 Not reviewed this visit You Were Diagnosed With   
  
 Codes Comments Obesity (BMI 30-39.9)    -  Primary ICD-10-CM: F55.7 ICD-9-CM: 278.00 Vitamin D deficiency     ICD-10-CM: E55.9 ICD-9-CM: 268.9 Vitals BP Pulse Resp Height(growth percentile) Weight(growth percentile) LMP  
 112/59 80 16 5' 4.5\" (1.638 m) 187 lb 4.8 oz (85 kg) 04/25/2018 BMI OB Status Smoking Status 31.65 kg/m2 Having regular periods Never Smoker Vitals History BMI and BSA Data Body Mass Index Body Surface Area  
 31.65 kg/m 2 1.97 m 2 Preferred Pharmacy Pharmacy Name Phone Jeff Wang, [facility] [phone] Your Updated Medication List  
  
   
This list is accurate as of 5/8/18  3:37 PM.  Always use your most recent med list.  
  
  
  
  
 buPROPion  mg XL tablet Commonly known as:  WELLBUTRIN XL  
TAKE 1 TABLET BY MOUTH EVERY MORNING  
 ergocalciferol 50,000 unit capsule Commonly known as:  ERGOCALCIFEROL Take 1 Cap by mouth every seven (7) days. ibuprofen 800 mg tablet Commonly known as:  MOTRIN Take 1 Tab by mouth every eight (8) hours as needed for Pain.  
  
 levonorgestrel-ethinyl estradiol 0.15 mg-30 mcg 3mpk Commonly known as:  Ronit Repine Take  by mouth.  
  
 rizatriptan 10 mg disintegrating tablet Commonly known as:  MAXALT-MLT Take 1 Tab by mouth once as needed for Migraine for up to 1 dose. We Performed the Following AMB POC EKG ROUTINE W/ 12 LEADS, INTER & REP [22531 CPT(R)] Follow-up Instructions Return in about 4 weeks (around 6/5/2018) for [facility] & Lab Draw. Patient Instructions Homework for [employer] 
 
 
 
Exercise Exercise daily  
- Start slow, gradually increase your time by around 10 to 20 % a week - To prevent injury, take a recovery week every 4 weeks (reduce your exercise time and intensity by 1/2 during this time) - Your goal is to work up to 150 min a week; hard enough that you can't whistle or sing. It may take 6 months to work up to this. - Call the Health  at [facility] labs for exercise ideas ([phone]) Common Side Effects (In order of how common) -Fatigue 
-Hunger 
-Constipation 
-Low sodium 
-Hair Loss 1. Fatigue Everyone goes through this stage It's normal 
It lasts 10 - 14 days It goes away It's your body adjusting to the Ketogenic diet and it's normal  
 
 
2. Hunger More common in the first few days After your body adjusts to the Ketogenic diet it will go away 3. Constipation  
-Drink at least 64 oz of non-calorie fluid a day 
-Add fiber (at least 20 grams a day) 1. Get the New Direction products with fiber added 2. Use SUGAR-FREE products: Fiber One products, Benefiber or Metamucil If you're prone to constipation: Take a Stool Softener every day. (eg - Dulcolax Stool Softener 100 mg or Miralax) If you get constipated: 1. Start with a Stool Softener (produces a bowel movement in 72 hr): 
 Examples: 
    Miralax 1 capful twice a day (It's OK to go up to 3 caps for a few days) Dulcolax Stool Softener 100 mg 2. Next: If you still have constipation after 2 or 3 days, add a Stimulant Laxative (this will produce a bowel movement in 6 - 12 hr): 
 Examples: 
    Exlax (1 small square) for up to 4 days Dulcolax stimulant laxative (8.25 mg) Magnesium citrate pill 500 mg 3 - 4 pills a day 3. Or you can go straight to a combination Stool Softener / Stimulant at night. If  you need, you can add a morning dose. Examples: 
    Pericolace 50 mg / 8.25 mg Senokot-S  50 mg / 8.25 mg 
 
   4. Finally If You're Really locked up, get Liquid Magnesium Citrate (take  according to the directions) 4. Low blood levels of sodium 
-Not very common, especially if you're not taking a diuretic (fluid pill) If you develop a headache, feel fatigued, light-headed or dizzy Drink 1/2 cup of bouillon broth up to twice a day until you feel normal 
 
 
5. Hair loss 
-This is fairly uncommon; you may see more than a usual amount of hair in your brush or the shower drain This can happen with any physical stress (surgery, trauma or calorie restriction) or psychological stress. Although is it very concerning, it is often temporary and will resolve within 3 months. Some people notice a benefit from taking a daily dose of Biotin 2,500 mcg and increasing their Fish Oil intake. Books to [address] [place] [address] 1. Change Anything: The [employer] of Personal Success 
by Marcia Dong, Ahmet Dorsey, and Javier Quinonez 2. Mindless Eating 
by Samia Price 3. Perfectly Yourself 
by Berta Rivas 4. Me, Myself and I - 28 Days to Creative Self-Love 
by Veena Kirk version only Note: Reading these books is a small but significant investment in yourself. Merely following the diet will reliably result in weight loss. However, revising how you respond to stressful situations, how you relate to food and, your commitment to self-care (adequate sleep, exercise & daily reflection) is the ONLY way to protect your weight loss and free yourself from your patterns that lead to weight gain. Compliance We do not expect perfection but we do ask for your persistence. Your success is directly corolleated to your willingness to do the work of growing yourself. You will hit plateaus in your weight loss. You will run into situations that test your will power. You will encounter times when you feel frustrated. It's OK if you slip up from time to time. However, how your respond to your slip ups and, the adjustments you make to prevent future slip ups will determine you long-term success. If you find yourself temporarily slipping in the program, it's OK. We will gently nudge you forward and encourage you to do the work of identifying and moving beyond your stuck areas. However, if you find yourself wavering in the program for a prolonged time (more than 3 or 4 months) we will ask that you take a break from the program and work with a counselor to do some focused work in order to identify and break the patterns that are holding you back. Once you and your counselor is convinced that you have moved past those patterns and want to give the program another chance, we will gladly work with you to determine if you're ready to start back in the program. 
 
When returning after a break, if it's been less than 3 months, you do not need to repeat an orientation, labs or an EKG. If it's over been 3 months you will required to repeat an orientation and, if greater than 6 months, you will be required to repeat an orientation, labs and an EKG. Additional Tips - Consume your 4 daily meal replacements equally spaced over the    day No more than 6 hours between each meal 
 Breakfast is especially important - Get the support of family and friends 
 
- Snack-proof your house - Have strategies for social situations, meetings that run over or vacation - When you fall off the plan it's no big deal; just start right back. Turn those days into examples of what to change or avoid next time. Long-term Healthy Weight / Body Fat Different ways to determining your ideal weight: 
1. Keep your waist to less than 1/2 of your height 2. Keep your % body fat to under 30% for female and under 20% if male 3. Keep your BMI around 25 Supplements 1. Vitacost Synergy Basic Multi-Vitamin (Their In-House Brand) Take 1 capsule twice a day Found ONLY at [facility], NOT at [facility], [facility], alike Vitamin [employer], etc 
    SKU #: [id number]  
    $16.49   / 1 month supply 
    www. [employer]  [phone]  
 
 
2. N-Acetyl Cysteine (NAC) -- 600 mg 
     1 pill once a day Found at many stores but [address] has a good price: 
     Item #: [id number] [id number]  $9.99 / 120 pills (4 month supply) 
     www. [employer]  [phone] 3. Turmeric with Black Pepper Extract (or Bioperine) 500 mg 
    1 pill 1 time a day (more is joint pain or increased inflammation) Found at many stores but [address] has a good price: 
    SKU #: [id number]  $13.64 / 61 pills 
    www. [employer]  [phone] 4. Probiotic: 15-35 (35 billion CFU) 1 capsule twice a day for 2 weeks, then 3 days a week SKU #: [id number]  $27.99 / 120 capsules 
     www. [employer]  [phone] 5. Fish Oil Take 1 capsule daily FYI:  
Typical fish oil per pill =  mg and  mg 
Krill oil per pill = EPA 50 - 70 mg and DHA 27 - 250 mg 
 
6. Other things to help with will power -Andria [employer] Crab Apple - Helps you with self acceptance Put 4 drops in 10 oz water or a meal replacement 2 - 4 times a day Around $15 a vial which lasts ~3 months 
       www. [employer]  [phone] Drink Filtered Water My favorite water filter (adds minerals to your water and cheaper than Dora) pH REFRESH Alkaline Water Pitcher Ionizer With 2 Long-Life Filters - Alkaline  Machine - Ionized Water Alkalizer Filter, 84oz, 2.5L (2017 Model) Paul Lauraside) Sold on Helpjuice.com w/ Free Shipping 
 
 
 
^^^^^^^^^^^^^^^^^^^^^^^^^^^^^^^^^^^^^^^^^^^^^^^^^^^^^^^^^^^^^^^^^^^^^^^^^^^^^^^^^^^^^^^^^^^^^^^ Carbohydrates If you do not metabolize carbohydrates well, you will lose weight and keep the weigh off by keeping your carbohydrate intake low. How do you know if you do not metabolize carbs well? If your Triglycerides, sdLCL-C and Insulin Resistance are elevated, then you will do well to follow a low carb diet. Fun Facts About Carbs - The Typical American Diet = 450 grams a day - The Reducing Phase of the VLCD = 40 grams a day - Target for weight loss maintenance: 
 - Eat no more than 30 grams per meal 
 - Eat no more than 10 grams per snack (mid-morning and mid-afternoon snack) Resources for finding out where carbs hide in our foods: 
1. Our Registered Dietitians 2. Www. HealthcareSource. com/tools 3. Read food labels 4. Books - The Calorie Courtney Majors - The New Atkins Diet for a New You 
     - Desiree Galvin's NEW Carb and Calorie 4th Edition (my favorite) 5. Smart phone and Internet-based apps - SpiralcatnessPal  
     - Carb Manager If you want to get a better picture of your cardiac risk, consider getting a coronary calcium score:  Call [phone] to schedule one. Body Mass Index: Care Instructions Your Care Instructions Body mass index (BMI) can help you see if your weight is raising your risk for health problems. It uses a formula to compare how much you weigh with how tall you are. · A BMI lower than 18.5 is considered underweight. · A BMI between 18.5 and 24.9 is considered healthy. · A BMI between 25 and 29.9 is considered overweight. A BMI of 30 or higher is considered obese. If your BMI is in the normal range, it means that you have a lower risk for weight-related health problems. If your BMI is in the overweight or obese range, you may be at increased risk for weight-related health problems, such as high blood pressure, heart disease, stroke, arthritis or joint pain, and diabetes. If your BMI is in the underweight range, you may be at increased risk for health problems such as fatigue, lower protection (immunity) against illness, muscle loss, bone loss, hair loss, and hormone problems. BMI is just one measure of your risk for weight-related health problems. You may be at higher risk for health problems if you are not active, you eat an unhealthy diet, or you drink too much alcohol or use tobacco products. Follow-up care is a key part of your treatment and safety. Be sure to make and go to all appointments, and call your doctor if you are having problems. It's also a good idea to know your test results and keep a list of the medicines you take. How can you care for yourself at home? · Practice healthy eating habits. This includes eating plenty of fruits, vegetables, whole grains, lean protein, and low-fat dairy. · If your doctor recommends it, get more exercise. Walking is a good choice. Bit by bit, increase the amount you walk every day. Try for at least 30 minutes on most days of the week. · Do not smoke. Smoking can increase your risk for health problems. If you need help quitting, talk to your doctor about stop-smoking programs and medicines. These can increase your chances of quitting for good. · Limit alcohol to 2 drinks a day for men and 1 drink a day for women.  Too much alcohol can cause health problems. If you have a BMI higher than 25 · Your doctor may do other tests to check your risk for weight-related health problems. This may include measuring the distance around your waist. A waist measurement of more than 40 inches in men or 35 inches in women can increase the risk of weight-related health problems. · Talk with your doctor about steps you can take to stay healthy or improve your health. You may need to make lifestyle changes to lose weight and stay healthy, such as changing your diet and getting regular exercise. If you have a BMI lower than 18.5 · Your doctor may do other tests to check your risk for health problems. · Talk with your doctor about steps you can take to stay healthy or improve your health. You may need to make lifestyle changes to gain or maintain weight and stay healthy, such as getting more healthy foods in your diet and doing exercises to build muscle. Where can you learn more? Go to http://luis-berna.info/. Enter S176 in the search box to learn more about \"Body Mass Index: Care Instructions. \" Current as of: October 13, 2016 Content Version: 11.4 © 5646-5982 Helpmycash. Care instructions adapted under license by [employer] (which disclaims liability or warranty for this information). If you have questions about a medical condition or this instruction, always ask your healthcare professional. [address] any warranty or liability for your use of this information. Patient Instructions History Introducing \[facility] [place] [facility]\"" & HEALTH SERVICES! Dear Katharine Walsh: 
Thank you for requesting a [employer] account. Our records indicate that you already have an active [employer] account. You can access your account anytime at [email or web address] Did you know that you can access your hospital and ER discharge instructions at any time in Complexa? You can also review all of your test results from your hospital stay or ER visit. Additional Information If you have questions, please visit the Frequently Asked Questions section of the [employer]a website at [email or web address] Credit Card To Get Approved For. [email or web address] Remember, Complexa is NOT to be used for urgent needs. For medical emergencies, dial 911. Now available from your iPhone and Android! Please provide this summary of care documentation to your next provider. Your primary care clinician is listed as [address]. If you have any questions after today's visit, please call [phone].

## 2018-05-08 NOTE — PROGRESS NOTES
[facility] Loss Program Progress Note: Initial Physician Visit     Candie Sykes is a 40 y.o. female who is here for medical screening for entering the [facility] Weight Loss Program.     CC:  Obesity      Weight History  I personally reviewed the Medical Screening Lisa Scot completed by patient and scanned into media section of chart. It includes duration of their obesity, maximum weight, goal weight and weight gain time line (timing), all of which give the context of their obesity AND a Family History of their obesity. Is their Weight Loss Goal entered in to Comments? Yes = 145 lb    Weight loss History  I personally reviewed the Medical Screening Lisa Scot completed by the patient and scanned into media section of chart. It includes number of weight loss attempts, the weight loss program that patients was most successful using, and if they have any hx of anorectic medication use, including OTC supplements. This captures modifying factors. Significant Medical History  Past Medical History:   Diagnosis Date    GERD (gastroesophageal reflux disease)     currently resolved    IBS (irritable bowel syndrome)     Leiomyoma of uterus     Obesity (BMI 30-39.9) 3/9/2018       I personally reviewed the Medical Screening Lisa Scot completed by the patient and scanned into media section of chart. This allows me to assess associated symptoms that are significant in the assessment of the patient's obesity and the patient's Past Medical History. Outpatient Prescriptions Marked as Taking for the 5/8/18 encounter (Office Visit) with Angie Pradhan, DO   Medication Sig Dispense Refill    buPROPion XL (WELLBUTRIN XL) 300 mg XL tablet TAKE 1 TABLET BY MOUTH EVERY MORNING 90 Tab 0    ergocalciferol (ERGOCALCIFEROL) 50,000 unit capsule Take 1 Cap by mouth every seven (7) days. 12 Cap 1    levonorgestrel-ethinyl estradiol (SEASONALE) 0.15-30 mg-mcg per tablet Take  by mouth. Significant Psychosocial History   Has a doctor every diagnosed with Binge Eating Disorder, Bulemia or Anorexia? : no     Compliance  Upcoming Travel? no    Social History  Social History   Substance Use Topics    Smoking status: Never Smoker    Smokeless tobacco: Never Used    Alcohol use 2.0 oz/week     4 Glasses of wine per week      Comment: 4-5 glasses per week       Exercise  I personally reviewed the Medical Screening Gloria Blunt completed by the patient and scanned into media section of chart. Review of Systems  See HPI        Objective  Visit Vitals    /59    Pulse 80    Resp 16    Ht 5' 4.5\" (1.638 m)    Wt 187 lb 4.8 oz (85 kg)    LMP 04/25/2018    BMI 31.65 kg/m2         Weight Metrics 5/8/2018 5/8/2018 3/9/2018 2/2/2017 12/22/2016 12/9/2016 7/28/2016   Weight - 187 lb 4.8 oz 181 lb 186 lb 11.2 oz 187 lb 3.2 oz 191 lb 197 lb   Waist Measure Inches 36.25 - - - - - -   Body Fat % 37.1 - - - - - -   BMI - 31.65 kg/m2 31.07 kg/m2 32.05 kg/m2 32.13 kg/m2 32.79 kg/m2 33.8 kg/m2       Labs: See HDL labs scanned into Media section       Physical Exam    Vital Signs Reviewed  Weight Management Metrics Reviewed    Appearance: Obese  HEENT:  Scleral icterus?  no  Neck:  Thyromegaly or nodules? Recently worked up by Dr Martinez Ludwig  Mouth: Large tongue no  Heart:  RRR  Lungs:  LCTA-B  Abdomen:     Hepatomegaly? no   Striae present? no  Skin:    Acne?  no   Hirsutism? no   Skin tags? no   Acanthosis Nigricans?  no  Ext:  Edema? no      Assessment & Plan  Encounter Diagnoses   Name Primary?  Obesity (BMI 30-39. 9) Yes    Vitamin D deficiency        1. Labs reviewed w/ patient    2. EKG reviewed w/ patient:   Personally reviewed by me\   QTc = 396 sec (Upper limit is 440 for males & 460 for females)    Sinus  Rhythm   -RSR(V1) -nondiagnostic. PROBABLY NORMAL    3. Medication changes include: None    4.  Based on his history, labs and EKG, Juan Huynh is now enrolled for the [facility].     5.  Individualized Patient Plan is as follows:   Diet Regimen: 4 Meal Replacements daily. Weigh in: weekly at [facility]   BP f/up plan: weekly at [facility]       25 min of the 45 minutes face to face time with Jordi consisted of counseling & coordinating and/or discussing treatment plans in reference to her above mentioned diagnoses.

## 2018-05-08 NOTE — PATIENT INSTRUCTIONS
Homework for [employer]        Exercise    Exercise daily   - Start slow, gradually increase your time by around 10 to 20 % a week    - To prevent injury, take a recovery week every 4 weeks (reduce your exercise time and intensity by 1/2 during this time)    - Your goal is to work up to 150 min a week; hard enough that you can't whistle or sing. It may take 6 months to work up to this. - Call the Health  at [facility] labs for exercise ideas ([phone])          Common Side Effects   (In order of how common)    -Fatigue  -Hunger  -Constipation  -Low sodium  -Hair Loss      1. Fatigue  Everyone goes through this stage  It's normal  It lasts 10 - 14 days  It goes away  It's your body adjusting to the Ketogenic diet and it's normal       2. Hunger  More common in the first few days  After your body adjusts to the Ketogenic diet it will go away       3. Constipation   -Drink at least 64 oz of non-calorie fluid a day  -Add fiber (at least 20 grams a day)       1. Get the [facility] products with fiber added     2. Use SUGAR-FREE products: Fiber One products, Benefiber or Metamucil    If you're prone to constipation: Take a Stool Softener every day.     (eg - Dulcolax Stool Softener 100 mg or Miralax)    If you get constipated:     1. Start with a Stool Softener (produces a bowel movement in 72 hr):   Examples:      Miralax 1 capful twice a day (It's OK to go up to 3 caps for a few days)      Dulcolax Stool Softener 100 mg       2. Next: If you still have constipation after 2 or 3 days, add a Stimulant          Laxative (this will produce a bowel movement in 6 - 12 hr):   Examples:      Exlax (1 small square) for up to 4 days      Dulcolax stimulant laxative (8.25 mg)       Magnesium citrate pill 500 mg 3 - 4 pills a day       3. Or you can go straight to a combination Stool Softener / Stimulant at night. If  you need, you can add a morning dose.    Examples:      Pericolace 50 mg / 8.25 mg      Senokot-S  50 mg / 8.25 mg       4. Finally If You're Really locked up, get Liquid Magnesium Citrate (take  according to the directions)      4. Low blood levels of sodium  -Not very common, especially if you're not taking a diuretic (fluid pill)  If you develop a headache, feel fatigued, light-headed or dizzy    Drink 1/2 cup of bouillon broth up to twice a day until you feel normal      5. Hair loss  -This is fairly uncommon; you may see more than a usual amount of hair in your brush or the shower drain  This can happen with any physical stress (surgery, trauma or calorie restriction) or psychological stress. Although is it very concerning, it is often temporary and will resolve within 3 months. Some people notice a benefit from taking a daily dose of Biotin 2,500 mcg and increasing their Fish Oil intake. Books to [address] [place] [address]    1. Change Anything: The [employer] of Personal Success  by Willie Hurst, Georgina Zamora, and Nik Conway    2. Mindless Eating  by Eliana Ceballos    3. Perfectly Yourself  by Thania Santiago    4. Me, Myself and I - 28 Days to Creative Self-Love  by Max Smith version only    Note: Reading these books is a small but significant investment in yourself. Merely following the diet will reliably result in weight loss. However, revising how you respond to stressful situations, how you relate to food and, your commitment to self-care (adequate sleep, exercise & daily reflection) is the ONLY way to protect your weight loss and free yourself from your patterns that lead to weight gain. Compliance    We do not expect perfection but we do ask for your persistence. Your success is directly corolleated to your willingness to do the work of growing yourself. You will hit plateaus in your weight loss. You will run into situations that test your will power. You will encounter times when you feel frustrated.     It's OK if you slip up from time to time. However, how your respond to your slip ups and, the adjustments you make to prevent future slip ups will determine you long-term success. If you find yourself temporarily slipping in the program, it's OK. We will gently nudge you forward and encourage you to do the work of identifying and moving beyond your stuck areas. However, if you find yourself wavering in the program for a prolonged time (more than 3 or 4 months) we will ask that you take a break from the program and work with a counselor to do some focused work in order to identify and break the patterns that are holding you back. Once you and your counselor is convinced that you have moved past those patterns and want to give the program another chance, we will gladly work with you to determine if you're ready to start back in the program.    When returning after a break, if it's been less than 3 months, you do not need to repeat an orientation, labs or an EKG. If it's over been 3 months you will required to repeat an orientation and, if greater than 6 months, you will be required to repeat an orientation, labs and an EKG. Additional Tips    - Consume your 4 daily meal replacements equally spaced over the    day   No more than 6 hours between each meal   Breakfast is especially important    - Get the support of family and friends    - Snack-proof your house    - Have strategies for social situations, meetings that run over or vacation      - When you fall off the plan it's no big deal; just start right back. Turn those days into examples of what to change or avoid next time. Long-term Healthy Weight / Body Fat  Different ways to determining your ideal weight:  1. Keep your waist to less than 1/2 of your height   2. Keep your % body fat to under 30% for female and under 20% if male  3. Keep your BMI around 25          Supplements      1.   Vitacost Synergy Basic Multi-Vitamin (Their In-House Brand)      Take 1 capsule twice a day        Found ONLY at [facility], NOT at [facility], [employer], [facility], the Vitamin Shoppe, etc      SKU #: [id number]       $16.49   / 1 month supply      [employer]com  [phone]       2. N-Acetyl Cysteine (NAC) -- 600 mg       1 pill once a day       Found at many stores but Lisa Ortega has a good price:       Item #: [id number]  $9.99 / 120 pills (4 month supply)       www. [facility]  [phone]      3. Turmeric with Black Pepper Extract (or Bioperine) 500 mg      1 pill 1 time a day (more is joint pain or increased inflammation)      Found at many stores but [employer] has a good price:      SKU #: [id number]  $13.64 / 60 pills      [email or web address]  [phone]       4. Probiotic: 15-35 (35 billion CFU)         1 capsule twice a day for 2 weeks, then 3 days a week       SKU #: [id number]  $27.99 / 120 capsules       [email or web address]  [phone]       5. Fish Oil      Take 1 capsule daily                             FYI:   Typical fish oil per pill =  mg and  mg  Krill oil per pill = EPA 50 - 70 mg and DHA 27 - 250 mg    6. Other things to help with will power      -[id number] you with self acceptance           Put 4 drops in 10 oz water or a meal replacement 2 - 4 times a day         Around $15 a vial which lasts ~3 months         www. [facility] [facility]  [phone]                Drink Filtered Water    My favorite water filter (adds minerals to your water and cheaper than Dora)    pH REFRESH Alkaline Water Pitcher Ionizer With 2 Long-Life Filters - Alkaline  Machine - Ionized Water Alkalizer Filter, 84oz, 2.5L (2017 Model) (White)   Sold on [employer] w/ Free Shipping        ^^^^^^^^^^^^^^^^^^^^^^^^^^^^^^^^^^^^^^^^^^^^^^^^^^^^^^^^^^^^^^^^^^^^^^^^^^^^^^^^^^^^^^^^^^^^^^^      Carbohydrates     If you do not metabolize carbohydrates well, you will lose weight and keep the weigh off by keeping your carbohydrate intake low. How do you know if you do not metabolize carbs well? If your Triglycerides, sdLCL-C and Insulin Resistance are elevated, then you will do well to follow a low carb diet. Fun Facts About Carbs    - The Typical American Diet = 450 grams a day    - The Reducing Phase of the VLCD = 40 grams a day    - Target for weight loss maintenance:   - Eat no more than 30 grams per meal   - Eat no more than 10 grams per snack (mid-morning and mid-afternoon snack)        Resources for finding out where carbs hide in our foods:  1. Our Registered Dietitians    2. Www. Atkins. com/tools    3. Read food labels    4. Books       - The Calorie Teresa Dryer       - The [place] System Diet for a New You       - Desiree Galvin's NEW Carb and Calorie 4th Edition (my favorite)    5. Smart phone and Internet-based apps       - The Campaign Solution        - Carb Manager      If you want to get a better picture of your cardiac risk, consider getting a coronary calcium score:  Call [phone] to schedule one. Body Mass Index: Care Instructions  Your Care Instructions    Body mass index (BMI) can help you see if your weight is raising your risk for health problems. It uses a formula to compare how much you weigh with how tall you are. · A BMI lower than 18.5 is considered underweight. · A BMI between 18.5 and 24.9 is considered healthy. · A BMI between 25 and 29.9 is considered overweight. A BMI of 30 or higher is considered obese. If your BMI is in the normal range, it means that you have a lower risk for weight-related health problems. If your BMI is in the overweight or obese range, you may be at increased risk for weight-related health problems, such as high blood pressure, heart disease, stroke, arthritis or joint pain, and diabetes. If your BMI is in the underweight range, you may be at increased risk for health problems such as fatigue, lower protection (immunity) against illness, muscle loss, bone loss, hair loss, and hormone problems.   BMI is just one measure of your risk for weight-related health problems. You may be at higher risk for health problems if you are not active, you eat an unhealthy diet, or you drink too much alcohol or use tobacco products. Follow-up care is a key part of your treatment and safety. Be sure to make and go to all appointments, and call your doctor if you are having problems. It's also a good idea to know your test results and keep a list of the medicines you take. How can you care for yourself at home? · Practice healthy eating habits. This includes eating plenty of fruits, vegetables, whole grains, lean protein, and low-fat dairy. · If your doctor recommends it, get more exercise. Walking is a good choice. Bit by bit, increase the amount you walk every day. Try for at least 30 minutes on most days of the week. · Do not smoke. Smoking can increase your risk for health problems. If you need help quitting, talk to your doctor about stop-smoking programs and medicines. These can increase your chances of quitting for good. · Limit alcohol to 2 drinks a day for men and 1 drink a day for women. Too much alcohol can cause health problems. If you have a BMI higher than 25  · Your doctor may do other tests to check your risk for weight-related health problems. This may include measuring the distance around your waist. A waist measurement of more than 40 inches in men or 35 inches in women can increase the risk of weight-related health problems. · Talk with your doctor about steps you can take to stay healthy or improve your health. You may need to make lifestyle changes to lose weight and stay healthy, such as changing your diet and getting regular exercise. If you have a BMI lower than 18.5  · Your doctor may do other tests to check your risk for health problems. · Talk with your doctor about steps you can take to stay healthy or improve your health.  You may need to make lifestyle changes to gain or maintain weight and stay healthy, such as getting more healthy foods in your diet and doing exercises to build muscle. Where can you learn more? Go to http://luis-berna.info/. Enter S176 in the search box to learn more about \"Body Mass Index: Care Instructions. \"  Current as of: October 13, 2016  Content Version: 11.4  © 7860-1407 [employer]. Care instructions adapted under license by [employer]. (which disclaims liability or warranty for this information). If you have questions about a medical condition or this instruction, always ask your healthcare professional. [address] any warranty or liability for your use of this information.

## 2018-05-18 ENCOUNTER — CLINICAL SUPPORT (OUTPATIENT)
Dept: FAMILY MEDICINE CLINIC | Age: 38
End: 2018-05-18

## 2018-05-18 VITALS
HEART RATE: 84 BPM | SYSTOLIC BLOOD PRESSURE: 120 MMHG | HEIGHT: 65 IN | RESPIRATION RATE: 16 BRPM | BODY MASS INDEX: 29.76 KG/M2 | DIASTOLIC BLOOD PRESSURE: 77 MMHG | WEIGHT: 178.6 LBS

## 2018-05-18 DIAGNOSIS — E66.9 OBESITY (BMI 35.0-39.9 WITHOUT COMORBIDITY): Primary | ICD-10-CM

## 2018-05-18 NOTE — PROGRESS NOTES
Progress Note: Weekly Education Class in the [facility] Loss Program   Is there anything that you or the patient needs to let Dr Christine Rogers know about? no  Over the past week, have you experienced any side-effects? Yes; headache , nausea, irritability and  fatigue    Kelley Chiang is a 40 y.o. female who is enrolled in [facility]    Visit Vitals     5' 4.5\" (1.638 m)    Wt 178 lb 9.6 oz (81 kg)    LMP 04/25/2018    BMI 30.18 kg/m2     Weight Metrics 5/18/2018 5/8/2018 5/8/2018 3/9/2018 2/2/2017 12/22/2016 12/9/2016   Weight 178 lb 9.6 oz - 187 lb 4.8 oz 181 lb 186 lb 11.2 oz 187 lb 3.2 oz 191 lb   Waist Measure Inches - 36.25 - - - - -   Body Fat % - 37.1 - - - - -   BMI 30.18 kg/m2 - 31.65 kg/m2 31.07 kg/m2 32.05 kg/m2 32.13 kg/m2 32.79 kg/m2         Have you received any other medical care this week? no  If yes, where and for what? Have you had any change in your medications since your last visit? no  If yes what? Did you have any problems adhering to the program last week? no  If yes, please explain:       Eating Habits Over Last Week:  Did you take in 64 oz of non-caloric fluids? yes     Did you consume your 4 meal replacements each day?  yes       Physical Activity Over the Past Week:    Aerobic exercise: 205 min  Resistance exercise: 2 workouts / week

## 2018-05-22 ENCOUNTER — CLINICAL SUPPORT (OUTPATIENT)
Dept: FAMILY MEDICINE CLINIC | Age: 38
End: 2018-05-22

## 2018-05-22 VITALS
BODY MASS INDEX: 29.74 KG/M2 | HEART RATE: 77 BPM | HEIGHT: 65 IN | RESPIRATION RATE: 16 BRPM | SYSTOLIC BLOOD PRESSURE: 116 MMHG | DIASTOLIC BLOOD PRESSURE: 74 MMHG | WEIGHT: 178.5 LBS

## 2018-05-22 DIAGNOSIS — E66.9 OBESITY (BMI 35.0-39.9 WITHOUT COMORBIDITY): Primary | ICD-10-CM

## 2018-05-22 NOTE — PROGRESS NOTES
Progress Note: Weekly Education Class in the [facility] Program   Is there anything that you or the patient needs to let Dr Mago Hinton know about? no  Over the past week, have you experienced any side-effects? no    Sanjay Saucedo is a 40 y.o. female who is enrolled in [facility]    Visit Vitals    Ht 5' 4.5\" (1.638 m)    LMP 04/25/2018     Weight Metrics 5/18/2018 5/18/2018 5/8/2018 5/8/2018 3/9/2018 2/2/2017 12/22/2016   Weight - 178 lb 9.6 oz - 187 lb 4.8 oz 181 lb 186 lb 11.2 oz 187 lb 3.2 oz   Waist Measure Inches 36 - 36.25 - - - -   Body Fat % 35.5 - 37.1 - - - -   BMI - 30.18 kg/m2 - 31.65 kg/m2 31.07 kg/m2 32.05 kg/m2 32.13 kg/m2         Have you received any other medical care this week? no  If yes, where and for what? Have you had any change in your medications since your last visit? yes  If yes what? Did you have any problems adhering to the program last week? no  If yes, please explain:       Eating Habits Over Last Week:  Did you take in 64 oz of non-caloric fluids? yes     Did you consume your 4 meal replacements each day?  yes       Physical Activity Over the Past Week:    Aerobic exercise: 70 min  Resistance exercise: 0 workouts / week

## 2018-11-02 ENCOUNTER — OFFICE VISIT (OUTPATIENT)
Dept: FAMILY MEDICINE CLINIC | Age: 38
End: 2018-11-02

## 2018-11-02 VITALS
SYSTOLIC BLOOD PRESSURE: 110 MMHG | TEMPERATURE: 98.9 F | HEIGHT: 65 IN | DIASTOLIC BLOOD PRESSURE: 62 MMHG | HEART RATE: 84 BPM | RESPIRATION RATE: 18 BRPM | BODY MASS INDEX: 28.72 KG/M2 | WEIGHT: 172.4 LBS | OXYGEN SATURATION: 98 %

## 2018-11-02 DIAGNOSIS — G47.9 SLEEP DISORDER: ICD-10-CM

## 2018-11-02 DIAGNOSIS — Z23 ENCOUNTER FOR IMMUNIZATION: ICD-10-CM

## 2018-11-02 DIAGNOSIS — E55.9 HYPOVITAMINOSIS D: ICD-10-CM

## 2018-11-02 DIAGNOSIS — Z00.00 ANNUAL PHYSICAL EXAM: Primary | ICD-10-CM

## 2018-11-02 DIAGNOSIS — M54.6 ACUTE LEFT-SIDED THORACIC BACK PAIN: ICD-10-CM

## 2018-11-02 DIAGNOSIS — E04.1 LEFT THYROID NODULE: ICD-10-CM

## 2018-11-02 RX ORDER — ERGOCALCIFEROL 1.25 MG/1
50000 CAPSULE ORAL
Qty: 12 CAP | Refills: 3 | Status: SHIPPED | OUTPATIENT
Start: 2018-11-02

## 2018-11-02 RX ORDER — CYCLOBENZAPRINE HCL 10 MG
10 TABLET ORAL
Qty: 20 TAB | Refills: 0 | Status: SHIPPED | OUTPATIENT
Start: 2018-11-02 | End: 2018-11-19 | Stop reason: SDUPTHER

## 2018-11-02 NOTE — PROGRESS NOTES
Flu shot Immunization/s administered 11/2/2018 by Jluis Best LPN with guardian's consent. Patient tolerated procedure well. No reactions noted.

## 2018-11-02 NOTE — PATIENT INSTRUCTIONS

## 2018-11-02 NOTE — PROGRESS NOTES
Blanca Heaton is a 45 y.o. female (: 1980) presenting to address: Chief Complaint Patient presents with  Complete Physical  
 
 
Vitals:  
 18 0827 BP: 110/62 Pulse: 84 Resp: 18 Temp: 98.9 °F (37.2 °C) TempSrc: Oral  
SpO2: 98% Weight: 172 lb 6.4 oz (78.2 kg) Height: 5' 4.5\" (1.638 m) PainSc:   4 PainLoc: Back LMP: 10/17/2018 Hearing/Vision:  
 
 Visual Acuity Screening Right eye Left eye Both eyes Without correction:     
With correction: 20/20 20/20 20/20 Learning Assessment:  
 
Learning Assessment 4/10/2014 PRIMARY LEARNER Patient HIGHEST LEVEL OF EDUCATION - PRIMARY LEARNER  4 YEARS OF COLLEGE  
BARRIERS PRIMARY LEARNER NONE PRIMARY LANGUAGE ENGLISH  
LEARNER PREFERENCE PRIMARY VIDEOS  
ANSWERED BY patient RELATIONSHIP SELF Depression Screening: PHQ over the last two weeks 3/9/2018 PHQ Not Done Active Diagnosis of Depression or Bipolar Disorder Little interest or pleasure in doing things - Feeling down, depressed, irritable, or hopeless - Total Score PHQ 2 - Trouble falling or staying asleep, or sleeping too much - Feeling tired or having little energy - Poor appetite, weight loss, or overeating - Feeling bad about yourself - or that you are a failure or have let yourself or your family down - Trouble concentrating on things such as school, work, reading, or watching TV - Moving or speaking so slowly that other people could have noticed; or the opposite being so fidgety that others notice - Thoughts of being better off dead, or hurting yourself in some way -  
PHQ 9 Score - Fall Risk Assessment:  
 
Fall Risk Assessment, last 12 mths 3/9/2018 Able to walk? Yes Fall in past 12 months? No  
 
Abuse Screening:  
 
Abuse Screening Questionnaire 3/9/2018 Do you ever feel afraid of your partner? Danie Wilson Are you in a relationship with someone who physically or mentally threatens you?  Danie Wilson  
 Is it safe for you to go home? Lew Singh Coordination of Care Questionaire: 1. Have you been to the ER, urgent care clinic since your last visit? Hospitalized since your last visit? NO 
 
2. Have you seen or consulted any other health care providers outside of the [address] [place] [address] since your last visit? Include any pap smears or colon screening. NO Advanced Directive: 1. Do you have an Advanced Directive? NO 
 
2. Would you like information on Advanced Directives?  NO

## 2018-11-04 NOTE — PROGRESS NOTES
SUBJECTIVE:  
45 y.o. female for annual routine checkup. Current Outpatient Medications Medication Sig Dispense Refill  cyclobenzaprine (FLEXERIL) 10 mg tablet Take 1 Tab by mouth nightly as needed for Muscle Spasm(s). 20 Tab 0  
 ergocalciferol (ERGOCALCIFEROL) 50,000 unit capsule Take 1 Cap by mouth every seven (7) days. 12 Cap 3  
 levonorgestrel-ethinyl estradiol (SEASONALE) 0.15-30 mg-mcg per tablet Take  by mouth.  rizatriptan (MAXALT-MLT) 10 mg disintegrating tablet Take 1 Tab by mouth once as needed for Migraine for up to 1 dose. 6 Tab 3 Past Medical History:  
Diagnosis Date  GERD (gastroesophageal reflux disease) currently resolved  IBS (irritable bowel syndrome)  Leiomyoma of uterus  Obesity (BMI 30-39.9) 3/9/2018 Allergies: Patient has no known allergies. Patient's last menstrual period was 10/17/2018. ROS:  Feeling well. No dyspnea or chest pain on exertion. No abdominal pain, change in bowel habits, black or bloody stools. No urinary tract symptoms. GYN ROS: no breast pain or new or enlarging lumps on self exam. No neurological complaints. OBJECTIVE: The patient appears well, alert, oriented x 3, in no distress. Visit Vitals /62 (BP 1 Location: Left arm, BP Patient Position: Sitting) Pulse 84 Temp 98.9 °F (37.2 °C) (Oral) Resp 18 Ht 5' 4.5\" (1.638 m) Wt 172 lb 6.4 oz (78.2 kg) LMP 10/17/2018 SpO2 98% BMI 29.14 kg/m² General appearance: alert, cooperative, no distress, appears stated age Head: Normocephalic, without obvious abnormality, atraumatic Ears: normal TM's and external ear canals AU Throat: Lips, mucosa, and tongue normal. Teeth and gums normal 
Neck: supple, symmetrical, trachea midline, no adenopathy, thyroid: not enlarged, symmetric, no tenderness/mass/nodules, no carotid bruit and no JVD Back: symmetric, no curvature. ROM normal. No CVA tenderness. Lungs: clear to auscultation bilaterally Breasts: breasts appear normal, no suspicious masses, no skin or nipple changes or axillary nodes. Heart: regular rate and rhythm, S1, S2 normal, no murmur, click, rub or gallop Abdomen: soft, non-tender. Bowel sounds normal. No masses,  no organomegaly Extremities: extremities normal, atraumatic, no cyanosis or edema Pulses: 2+ and symmetric Skin: Skin color, texture, turgor normal. No rashes or lesions Neurological is normal, no focal findings. Lab Results Component Value Date/Time WBC 6.8 03/09/2018 08:36 AM  
 HGB 11.3 (L) 03/09/2018 08:36 AM  
 HCT 35.3 03/09/2018 08:36 AM  
 PLATELET 000 86/61/5678 08:36 AM  
 MCV 81.3 03/09/2018 08:36 AM  
 
 
 
Lab Results Component Value Date/Time Sodium 141 03/09/2018 08:36 AM  
 Potassium 4.5 03/09/2018 08:36 AM  
 Chloride 109 (H) 03/09/2018 08:36 AM  
 CO2 25 03/09/2018 08:36 AM  
 Anion gap 7 03/09/2018 08:36 AM  
 Glucose 90 03/09/2018 08:36 AM  
 BUN 11 03/09/2018 08:36 AM  
 Creatinine 0.89 03/09/2018 08:36 AM  
 BUN/Creatinine ratio 12 03/09/2018 08:36 AM  
 GFR est AA >60 03/09/2018 08:36 AM  
 GFR est non-AA >60 03/09/2018 08:36 AM  
 Calcium 9.1 03/09/2018 08:36 AM  
 
 
 
Lab Results Component Value Date/Time ALT (SGPT) 18 03/09/2018 08:36 AM  
 AST (SGOT) 15 03/09/2018 08:36 AM  
 Alk. phosphatase 65 03/09/2018 08:36 AM  
 Bilirubin, direct 0.2 03/09/2018 08:36 AM  
 Bilirubin, total 0.6 03/09/2018 08:36 AM  
 
 
 
Lab Results Component Value Date/Time Cholesterol, total 146 03/09/2018 08:36 AM  
 HDL Cholesterol 84 (H) 03/09/2018 08:36 AM  
 LDL, calculated 45 03/09/2018 08:36 AM  
 VLDL, calculated 17 03/09/2018 08:36 AM  
 Triglyceride 85 03/09/2018 08:36 AM  
 CHOL/HDL Ratio 1.7 03/09/2018 08:36 AM  
 
 
 
Lab Results Component Value Date/Time TSH 0.85 03/09/2018 08:36 AM  
 Triiodothyronine (T3), free 2.7 03/09/2018 08:36 AM  
 T4, Free 1.1 03/09/2018 08:36 AM  
 
 
 
Lab Results Component Value Date/Time Vitamin D 25-Hydroxy 22.4 (L) 03/09/2018 08:36 AM  
   
 
 
 
ASSESSMENT:  
Diagnoses and all orders for this visit: 
 
1. Annual physical exam 
-     CBC WITH AUTOMATED DIFF; Future 
-     HEPATIC FUNCTION PANEL; Future -     LIPID PANEL; Future -     METABOLIC PANEL, BASIC; Future 
-     TSH 3RD GENERATION; Future -     T4, FREE; Future -     URINALYSIS W/ RFLX MICROSCOPIC; Future -     VITAMIN D, 25 HYDROXY; Future 2. Sleep disorder 
-     SLEEP MEDICINE REFERRAL 3. Hypovitaminosis D 
-     ergocalciferol (ERGOCALCIFEROL) 50,000 unit capsule; Take 1 Cap by mouth every seven (7) days. 4. Encounter for immunization -     VA IMMUNIZ ADMIN,1 SINGLE/COMB VAC/TOXOID 
-     INFLUENZA VIRUS VAC QUAD,SPLIT,PRESV FREE SYRINGE IM 
 
5. Acute left-sided thoracic back pain Other orders 
-     cyclobenzaprine (FLEXERIL) 10 mg tablet; Take 1 Tab by mouth nightly as needed for Muscle Spasm(s). PLAN:  
pap smear: with her Gyn. Will refer her to a sleep specialist because she struggles with falling asleep and staying asleep. She has a chronic issue with feeling fatigued, her thyroid eval has been normal. 
She needs to take her Vit D  On a more regular basis. Will try the Flexeril for her back pain and notify me if she is not getting better. No reported injuries. return annually or prn The plan was discussed with the patient. The patient verbalized understanding and is in agreement with the plan. All medication potential side effects were discussed with the patient.

## 2018-11-19 RX ORDER — CYCLOBENZAPRINE HCL 10 MG
10 TABLET ORAL
Qty: 20 TAB | Refills: 0 | Status: SHIPPED | OUTPATIENT
Start: 2018-11-19 | End: 2020-04-08 | Stop reason: ALTCHOICE

## 2019-02-13 ENCOUNTER — TELEPHONE (OUTPATIENT)
Dept: FAMILY MEDICINE CLINIC | Age: 39
End: 2019-02-13

## 2019-02-15 NOTE — TELEPHONE ENCOUNTER
Called patient and left a voicemail for her to let her know that Dr. Michelle Ludwig did order the referral for endo and I also left the phone number. I left a call back number for the patient to call back with any questions.

## 2019-02-22 ENCOUNTER — CLINICAL SUPPORT (OUTPATIENT)
Dept: FAMILY MEDICINE CLINIC | Age: 39
End: 2019-02-22

## 2019-02-22 DIAGNOSIS — Z11.1 PPD SCREENING TEST: Primary | ICD-10-CM

## 2019-02-22 NOTE — PROGRESS NOTES
PPD Placement note  Donna Villa, 45 y.o. female is here today for placement of PPD test  Reason for PPD test: Work  Pt taken PPD test before: yes  Verified in allergy area and with patient that they are not allergic to the products PPD is made of (Phenol or Tween). Yes  Is patient taking any oral or IV steroid medication now or have they taken it in the last month? no  Has the patient ever received the BCG vaccine?: no  Has the patient been in recent contact with anyone known or suspected of having active TB disease?: no       Date of exposure (if applicable): N/A       Name of person they were exposed to (if applicable): N/A  Patient's Country of origin?: [place]   O: Alert and oriented in NAD. P:  PPD placed on 2/22/2019 at 1053am in the right forearm. Patient advised to return for reading within 48-72 hours.

## 2020-04-07 ENCOUNTER — TELEPHONE (OUTPATIENT)
Dept: FAMILY MEDICINE CLINIC | Age: 40
End: 2020-04-07

## 2020-04-07 NOTE — TELEPHONE ENCOUNTER
Pt called to request a refill on bupropion however I do not see it on her current med list. She states that she has not needed it for a long time. Please advise. No future appointments.

## 2020-04-07 NOTE — TELEPHONE ENCOUNTER
Appt scheduled.      Future Appointments   Date Time Provider Danielito Taylor   4/8/2020 11:00 AM Karla Mahoney MD [facility]

## 2020-04-08 ENCOUNTER — VIRTUAL VISIT (OUTPATIENT)
Dept: FAMILY MEDICINE CLINIC | Age: 40
End: 2020-04-08

## 2020-04-08 DIAGNOSIS — Z00.00 ANNUAL PHYSICAL EXAM: ICD-10-CM

## 2020-04-08 DIAGNOSIS — F32.89 OTHER DEPRESSION: Primary | ICD-10-CM

## 2020-04-08 DIAGNOSIS — E66.3 OVERWEIGHT: ICD-10-CM

## 2020-04-08 RX ORDER — BUPROPION HYDROCHLORIDE 300 MG/1
300 TABLET ORAL DAILY
COMMUNITY
End: 2020-04-08 | Stop reason: SDUPTHER

## 2020-04-08 RX ORDER — BUPROPION HYDROCHLORIDE 300 MG/1
300 TABLET ORAL DAILY
Qty: 90 TAB | Refills: 0 | Status: SHIPPED | OUTPATIENT
Start: 2020-04-08 | End: 2020-07-01

## 2020-04-08 NOTE — PROGRESS NOTES
Consent: Rossy Bynum, who was seen by synchronous (real-time) audio-video technology, and/or her healthcare decision maker, is aware that this patient-initiated, Telehealth encounter on 4/8/2020 is a billable service, with coverage as determined by her insurance carrier. She is aware that she may receive a bill and has provided verbal consent to proceed: Yes. Assessment & Plan:   Diagnoses and all orders for this visit:    1. Other depression  -     buPROPion XL (Wellbutrin XL) 300 mg XL tablet; Take 1 Tab by mouth daily. 2. Annual physical exam  -     CBC WITH AUTOMATED DIFF; Future  -     HEPATIC FUNCTION PANEL; Future  -     LIPID PANEL; Future  -     METABOLIC PANEL, BASIC; Future  -     TSH 3RD GENERATION; Future  -     T4, FREE; Future  -     VITAMIN D, 25 HYDROXY; Future    3. Overweight        Pt has taken Wellbutrinin the past, is familiar with it and has tolerated it well. Will start her back on this and if she has any issues, she will set up a virtual visit. Encouraged her to monitor her diet, get out and go for walks daily. Otherwise, will see her back for a physical in June / July. BW.      712  Subjective:   Rossy Bynum is a 44 y.o. female who was seen for Medication Refill (wellbutrin)      Pt was seen on a virtual visit today. She has been dealing with depression issues again. She has a daughter heading to college in the fall, has a fiancee who is deployed and was told they are extending that. 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 She has also been struggling with her weight. Has been an on going issue for years and now while at home and stressed about Covid, she has been snacking much more. Prior to Admission medications    Medication Sig Start Date End Date Taking? Authorizing Provider   buPROPion XL (Wellbutrin XL) 300 mg XL tablet Take 1 Tab by mouth daily.  4/8/20  Yes Wilmer Duggan MD [facility]   ergocalciferol (ERGOCALCIFEROL) 50,000 unit capsule Take 1 Cap by mouth every seven (7) days. 11/2/18  Yes Alberto Lyles MD   levonorgestrel-ethinyl estradiol (SEASONALE) 0.15-30 mg-mcg per tablet Take  by mouth. Yes Provider, Historical   buPROPion XL (Wellbutrin XL) 300 mg XL tablet Take 300 mg by mouth daily. 4/8/20  Provider, Historical   cyclobenzaprine (FLEXERIL) 10 mg tablet TAKE 1 TAB BY MOUTH NIGHTLY AS NEEDED FOR MUSCLE SPASM(S). 11/19/18 4/8/20  Alberto Lyles MD   rizatriptan (MAXALT-MLT) 10 mg disintegrating tablet Take 1 Tab by mouth once as needed for Migraine for up to 1 dose. 9/25/15 4/8/20  Alberto Lyles MD     No Known Allergies    Patient Active Problem List   Diagnosis Code    Obesity (BMI 30-39. 9) E66.9    Vitamin D deficiency E55.9     Current Outpatient Medications   Medication Sig Dispense Refill    buPROPion XL (Wellbutrin XL) 300 mg XL tablet Take 1 Tab by mouth daily. 90 Tab 0    ergocalciferol (ERGOCALCIFEROL) 50,000 unit capsule Take 1 Cap by mouth every seven (7) days. 12 Cap 3    levonorgestrel-ethinyl estradiol (SEASONALE) 0.15-30 mg-mcg per tablet Take  by mouth. No Known Allergies  Past Medical History:   Diagnosis Date    GERD (gastroesophageal reflux disease)     currently resolved    IBS (irritable bowel syndrome)     Leiomyoma of uterus     Obesity (BMI 30-39.9) 3/9/2018     Past Surgical History:   Procedure Laterality Date    HX CHOLECYSTECTOMY  2002    HX GYN  2012    fibroid resection       Review of Systems   Psychiatric/Behavioral: Positive for depression.          Objective:     Visit Vitals  LMP 02/19/2020 Comment: only gets duncan 3 months due to birth control       General: alert, cooperative, no distress   Mental  status: normal mood, behavior, speech, dress, motor activity, and thought processes, able to follow commands   HENT: NCAT   Neck: no visualized mass   Resp: no respiratory distress   Neuro: no gross deficits   Skin: no discoloration or lesions of concern on visible areas   Psychiatric: normal affect, consistent with stated mood, no evidence of hallucinations but a little sad while talking with me. No suicidal ideations. Additional exam findings: We discussed the expected course, resolution and complications of the diagnosis(es) in detail. Medication risks, benefits, costs, interactions, and alternatives were discussed as indicated. I advised her to contact the office if her condition worsens, changes or fails to improve as anticipated. She expressed understanding with the diagnosis(es) and plan. Claudio Ulrich is a 44 y.o. female being evaluated by a video visit encounter for concerns as above. A caregiver was present when appropriate. Due to this being a TeleHealth encounter (During [place] [id number] public health emergency), evaluation of the following organ systems was limited: Vitals/Constitutional/EENT/Resp/CV/GI//MS/Neuro/Skin/Heme-Lymph-Imm. Pursuant to the emergency declaration under the [facility], [facility]5 waiver authority and the [employer] and [employer] Act, this Virtual  Visit was conducted, with patient's (and/or legal guardian's) consent, to reduce the patient's risk of exposure to COVID-19 and provide necessary medical care. Services were provided through a video synchronous discussion virtually to substitute for in-person clinic visit. Patient and provider were located at their individual homes.         Rylie Calles MD

## 2020-04-08 NOTE — PROGRESS NOTES
Patient prefers text to [phone]   Using doxy. gabo Fernández is a 44 y.o. female (: 1980) presenting to address:    Chief Complaint   Patient presents with    Medication Refill     wellbutrin       Vitals:    20 1051   PainSc:   0 - No pain   LMP: 2020       Hearing/Vision:   No exam data present    Learning Assessment:     Learning Assessment 4/10/2014   PRIMARY LEARNER Patient   HIGHEST LEVEL OF EDUCATION - PRIMARY LEARNER  4 YEARS OF COLLEGE   BARRIERS PRIMARY LEARNER NONE   PRIMARY LANGUAGE ENGLISH   LEARNER PREFERENCE PRIMARY VIDEOS   ANSWERED BY patient   RELATIONSHIP SELF     Depression Screening:     3 most recent PHQ Screens 3/9/2018   PHQ Not Done Active Diagnosis of Depression or Bipolar Disorder   Little interest or pleasure in doing things -   Feeling down, depressed, irritable, or hopeless -   Total Score PHQ 2 -   Trouble falling or staying asleep, or sleeping too much -   Feeling tired or having little energy -   Poor appetite, weight loss, or overeating -   Feeling bad about yourself - or that you are a failure or have let yourself or your family down -   Trouble concentrating on things such as school, work, reading, or watching TV -   Moving or speaking so slowly that other people could have noticed; or the opposite being so fidgety that others notice -   Thoughts of being better off dead, or hurting yourself in some way -   PHQ 9 Score -     Fall Risk Assessment:     Fall Risk Assessment, last 12 mths 3/9/2018   Able to walk? Yes   Fall in past 12 months? No     Abuse Screening:     Abuse Screening Questionnaire 3/9/2018   Do you ever feel afraid of your partner? N   Are you in a relationship with someone who physically or mentally threatens you? N   Is it safe for you to go home? Y     Coordination of Care Questionaire:   1. Have you been to the ER, urgent care clinic since your last visit? Hospitalized since your last visit? NO    2.  Have you seen or consulted any other health care providers outside of the [address] [place] [address] since your last visit? Include any pap smears or colon screening. NO    Advanced Directive:   1. Do you have an Advanced Directive? NO    2. Would you like information on Advanced Directives?  NO

## 2020-10-07 ENCOUNTER — VIRTUAL VISIT (OUTPATIENT)
Dept: FAMILY MEDICINE CLINIC | Age: 40
End: 2020-10-07
Payer: COMMERCIAL

## 2020-10-07 DIAGNOSIS — Z00.00 ANNUAL PHYSICAL EXAM: ICD-10-CM

## 2020-10-07 DIAGNOSIS — G56.01 CARPAL TUNNEL SYNDROME ON RIGHT: Primary | ICD-10-CM

## 2020-10-07 PROCEDURE — 99213 OFFICE O/P EST LOW 20 MIN: CPT | Performed by: INTERNAL MEDICINE

## 2020-10-07 NOTE — PROGRESS NOTES
Jairo Galeana is a 36 y.o. female who was seen by synchronous (real-time) audio-video technology on 10/7/2020 for Hand Pain        Assessment & Plan:   Diagnoses and all orders for this visit:    1. Carpal tunnel syndrome on right  -     REFERRAL TO HAND SURGERY    2. Annual physical exam  -     CBC WITH AUTOMATED DIFF; Future  -     HEPATIC FUNCTION PANEL; Future  -     LIPID PANEL; Future  -     METABOLIC PANEL, BASIC; Future  -     TSH 3RD GENERATION; Future  -     T4, FREE; Future  -     VITAMIN D, 25 HYDROXY; Future        Will start wearing a RT wrist splint at night and during the day, as much as possible. Will return for her physical.  712  Subjective:       Pt having numbness, pain tingling in the RT hand, daily now for 2 months. Has been dx years ago with carpel tunnel but was mild and she just managed on her own with it. Does work on a computer all day. Finds the pain to be quite severe at night, while sleeping. So bad that it forces her to get out of bed to shake her hand out for 20 mins before it feels like she can get back to bed. Prior to Admission medications    Medication Sig Start Date End Date Taking? Authorizing Provider   buPROPion XL (WELLBUTRIN XL) 300 mg XL tablet TAKE 1 TABLET BY MOUTH EVERY DAY 7/1/20   Nini Patino MD   ergocalciferol (ERGOCALCIFEROL) 50,000 unit capsule Take 1 Cap by mouth every seven (7) days. 11/2/18   Nini Patino MD   levonorgestrel-ethinyl estradiol (SEASONALE) 0.15-30 mg-mcg per tablet Take  by mouth. Provider, Historical     Patient Active Problem List   Diagnosis Code    Obesity (BMI 30-39. 9) E66.9    Vitamin D deficiency E55.9     Current Outpatient Medications   Medication Sig Dispense Refill    buPROPion XL (WELLBUTRIN XL) 300 mg XL tablet TAKE 1 TABLET BY MOUTH EVERY DAY 90 Tab 1    ergocalciferol (ERGOCALCIFEROL) 50,000 unit capsule Take 1 Cap by mouth every seven (7) days.  12 Cap 3    levonorgestrel-ethinyl estradiol (SEASONALE) 0.15-30 mg-mcg per tablet Take  by mouth. No Known Allergies  Past Medical History:   Diagnosis Date    GERD (gastroesophageal reflux disease)     currently resolved    IBS (irritable bowel syndrome)     Leiomyoma of uterus     Obesity (BMI 30-39.9) 3/9/2018     Past Surgical History:   Procedure Laterality Date    HX CHOLECYSTECTOMY  2002    HX GYN  2012    fibroid resection     Family History   Problem Relation Age of Onset    Psychiatric Disorder Mother     Arthritis-osteo Mother     Hypertension Mother     Sleep Apnea Father     Drug Abuse Father     Cancer Maternal Grandmother     Stroke Maternal Grandmother     Alzheimer Maternal Grandmother     Cancer Maternal Grandfather     Diabetes Maternal Grandfather      Social History     Tobacco Use    Smoking status: Never Smoker    Smokeless tobacco: Never Used   Substance Use Topics    Alcohol use: Yes     Alcohol/week: 3.3 standard drinks     Types: 4 Glasses of wine per week     Comment: 4-5 glasses per week       ROS    Objective:   No flowsheet data found. General: alert, cooperative, no distress   Mental  status: normal mood, behavior, speech, dress, motor activity, and thought processes, able to follow commands   HENT: NCAT   Neck: no visualized mass   Resp: no respiratory distress   Neuro: no gross deficits   Skin: no discoloration or lesions of concern on visible areas   Psychiatric: normal affect, consistent with stated mood, no evidence of hallucinations     Additional exam findings: We discussed the expected course, resolution and complications of the diagnosis(es) in detail. Medication risks, benefits, costs, interactions, and alternatives were discussed as indicated. I advised her to contact the office if her condition worsens, changes or fails to improve as anticipated. She expressed understanding with the diagnosis(es) and plan.        Lauren Ovalles, who was evaluated through a patient-initiated, synchronous (real-time) audio-video encounter, and/or her healthcare decision maker, is aware that it is a billable service, with coverage as determined by her insurance carrier. She provided verbal consent to proceed: Yes, and patient identification was verified. It was conducted pursuant to the emergency declaration under the [address] [place] [address] authority and the [employer] and [employer] Act. A caregiver was present when appropriate. Ability to conduct physical exam was limited. I was at home. The patient was at home.       Rosalva Grover MD

## 2020-11-23 ENCOUNTER — VIRTUAL VISIT (OUTPATIENT)
Dept: FAMILY MEDICINE CLINIC | Age: 40
End: 2020-11-23
Payer: COMMERCIAL

## 2020-11-23 DIAGNOSIS — J01.90 ACUTE NON-RECURRENT SINUSITIS, UNSPECIFIED LOCATION: Primary | ICD-10-CM

## 2020-11-23 PROCEDURE — 99213 OFFICE O/P EST LOW 20 MIN: CPT | Performed by: NURSE PRACTITIONER

## 2020-11-23 RX ORDER — METHYLPREDNISOLONE 4 MG/1
TABLET ORAL
Qty: 1 DOSE PACK | Refills: 0 | Status: SHIPPED | OUTPATIENT
Start: 2020-11-23

## 2020-11-23 RX ORDER — BENZONATATE 200 MG/1
200 CAPSULE ORAL
Qty: 30 CAP | Refills: 0 | Status: SHIPPED | OUTPATIENT
Start: 2020-11-23 | End: 2020-12-03

## 2020-11-23 RX ORDER — CEFDINIR 300 MG/1
300 CAPSULE ORAL 2 TIMES DAILY
Qty: 20 CAP | Refills: 0 | Status: SHIPPED | OUTPATIENT
Start: 2020-11-23 | End: 2020-12-03

## 2020-11-23 NOTE — PROGRESS NOTES
Alyson Shea is a 36 y.o. female who was seen by synchronous (real-time) audio-video technology using doxy. me on 11/23/2020. Mr#: [id number]    Consent:  She and/or her healthcare decision maker is aware that this patient-initiated Telehealth encounter is a billable service, with coverage as determined by her insurance carrier. She is aware that she may receive a bill and has provided verbal consent to proceed: YES    I was in the office while conducting this encounter. 712  HPI/Subjective: Thinks she has a sinus infection. Having sinus pressure around eyes, congestion, nasal burning, teeth hurt, and post nasal drip. Symptoms started about a week ago. Doing sudafed and mucinex sinus. Not doing any nasal sprays or netti pot (doesn't have any nasal spray at home and can't find her netti pot). Patient Active Problem List   Diagnosis Code    Obesity (BMI 30-39. 9) E66.9    Vitamin D deficiency E55.9            No Known Allergies        Current Outpatient Medications:     buPROPion XL (WELLBUTRIN XL) 300 mg XL tablet, TAKE 1 TABLET BY MOUTH EVERY DAY, Disp: 90 Tab, Rfl: 1    ergocalciferol (ERGOCALCIFEROL) 50,000 unit capsule, Take 1 Cap by mouth every seven (7) days. , Disp: 12 Cap, Rfl: 3    levonorgestrel-ethinyl estradiol (SEASONALE) 0.15-30 mg-mcg per tablet, Take  by mouth., Disp: , Rfl:       Review of Systems   Constitutional: Positive for malaise/fatigue. Negative for chills and fever. HENT: Positive for congestion and sinus pain. Negative for ear discharge, ear pain, hearing loss, nosebleeds, sore throat and tinnitus. Green rhinorrhea   Respiratory: Positive for cough. Negative for sputum production, shortness of breath and wheezing. Cardiovascular: Negative for chest pain, palpitations and leg swelling.          PHYSICAL EXAMINATION:    Constitutional: [x] Appears well-developed and well-nourished [x] No apparent distress        Mental status: [x] Alert and awake  [x] Oriented t [x] Able to follow commands      Eyes:   EOM    [x]  Normal        HENT: [x] Normocephalic,      Pulmonary/Chest: [x] Respiratory effort normal   [x] No visualized signs of difficulty breathing or respiratory distress           Musculoskeletal:   [x] No obvious deformities noted with regard to areas viewed           Neurological:        [x] No apparent neurologic deficits noted in areas viewed                 Skin:        [x] No apparent dermatologic abnormalities noted in areas viewed               Psychiatric:       [x] Normal Affect      Other pertinent observable physical exam findings:-None noted          Assessment & Plan:   1. Sinusitis  Cefdinir 300mg BID X10 days  Medrol dose pack (advised no NSAIDs while on)  Tessalon 200mg TID PRN for cough  Sinus rinse, nasal spray, steam, humidity, hot showers  Increase PO fluids  FU if symptoms worsen or do not improve      I advised her to contact the office if her condition worsens, changes, fails to improve as anticipated and with any new problems. She expressed understanding with the diagnosis(es) and plan. [facility], the patient is at home and the provider is at [address] [place] [address] to the emergency declaration under the [address] [place] [address], [address] [place] [address] authority and the [employer] and [employer] Act, this Virtual  Visit was conducted, with patient's consent, to reduce the patient's risk of exposure to COVID-19 and provide continuity of care for an established patient. Services were provided through a video synchronous discussion virtually to substitute for in-person clinic visit. Alberto Escobar NP         PLEASE NOTE:   This document has been produced using voice recognition software. Unrecognized errors in transcription may be present.

## 2020-12-01 ENCOUNTER — TELEPHONE (OUTPATIENT)
Dept: FAMILY MEDICINE CLINIC | Age: 40
End: 2020-12-01

## 2020-12-01 NOTE — TELEPHONE ENCOUNTER
Pt stated she had taken covid test on 10/24/2020 and had resulted positive on 10/26. Pt would like to know what do to other than quarintine for 2 weeks. Pt states she is still having a bad cough.  Please advise

## 2020-12-02 NOTE — TELEPHONE ENCOUNTER
I will send in Tessalon pearls for the cough. She should use Delsym cough syrup as well. Is she wheezing?

## 2020-12-08 ENCOUNTER — TELEPHONE (OUTPATIENT)
Dept: FAMILY MEDICINE CLINIC | Age: 40
End: 2020-12-08

## 2020-12-08 RX ORDER — BENZONATATE 200 MG/1
200 CAPSULE ORAL
Qty: 45 CAP | Refills: 0 | Status: SHIPPED | OUTPATIENT
Start: 2020-12-08 | End: 2020-12-23

## 2020-12-08 NOTE — TELEPHONE ENCOUNTER
Called to request that the Tessalon pearls be sent to the pharmacy and if you could prescribe the Delsym to be picked up as well since she is under quarantine.

## 2020-12-08 NOTE — TELEPHONE ENCOUNTER
Called and left message for patient to return call to inform her that the Kim Napier has been sent to the pharmacy and the Delsym does not need prescription it is over the counter and all pharmacy will grab it from over the counter so it can be purchase via drive through.

## 2023-06-09 NOTE — TELEPHONE ENCOUNTER
Patient calling to request referral for endocrinology. Referral is for the following condition or symptoms: She said she is still having issues with her thyroid    They would like the referral to go to Dr. Mary Ann Thompson or Dr. Waldemar Gosselin    Last office visit with [facility]. provider was 11/02/2019    Patient was advised that an appointment may or may not be needed. c/o dizziness as per mom while in shower earlier